# Patient Record
Sex: FEMALE | Race: WHITE | NOT HISPANIC OR LATINO | URBAN - METROPOLITAN AREA
[De-identification: names, ages, dates, MRNs, and addresses within clinical notes are randomized per-mention and may not be internally consistent; named-entity substitution may affect disease eponyms.]

---

## 2019-10-31 ENCOUNTER — EMERGENCY (EMERGENCY)
Facility: HOSPITAL | Age: 64
LOS: 1 days | Discharge: HOME | End: 2019-10-31
Attending: EMERGENCY MEDICINE
Payer: MEDICARE

## 2019-10-31 VITALS
HEIGHT: 60 IN | HEART RATE: 124 BPM | WEIGHT: 139.99 LBS | OXYGEN SATURATION: 96 % | SYSTOLIC BLOOD PRESSURE: 162 MMHG | TEMPERATURE: 98 F | RESPIRATION RATE: 16 BRPM | DIASTOLIC BLOOD PRESSURE: 908 MMHG

## 2019-10-31 VITALS
HEART RATE: 75 BPM | OXYGEN SATURATION: 97 % | TEMPERATURE: 98 F | SYSTOLIC BLOOD PRESSURE: 167 MMHG | DIASTOLIC BLOOD PRESSURE: 93 MMHG | RESPIRATION RATE: 18 BRPM

## 2019-10-31 PROCEDURE — 73562 X-RAY EXAM OF KNEE 3: CPT

## 2019-10-31 PROCEDURE — 99283 EMERGENCY DEPT VISIT LOW MDM: CPT

## 2019-10-31 PROCEDURE — 73562 X-RAY EXAM OF KNEE 3: CPT | Mod: 26,50

## 2019-10-31 PROCEDURE — 99283 EMERGENCY DEPT VISIT LOW MDM: CPT | Mod: 25

## 2019-10-31 PROCEDURE — 96372 THER/PROPH/DIAG INJ SC/IM: CPT

## 2019-10-31 PROCEDURE — 93005 ELECTROCARDIOGRAM TRACING: CPT

## 2019-10-31 RX ORDER — METOPROLOL TARTRATE 50 MG
50 TABLET ORAL ONCE
Refills: 0 | Status: COMPLETED | OUTPATIENT
Start: 2019-10-31 | End: 2019-10-31

## 2019-10-31 RX ORDER — BUPIVACAINE HCL/PF 7.5 MG/ML
10 VIAL (ML) INJECTION ONCE
Refills: 0 | Status: DISCONTINUED | OUTPATIENT
Start: 2019-10-31 | End: 2019-11-17

## 2019-10-31 RX ORDER — KETOROLAC TROMETHAMINE 30 MG/ML
60 SYRINGE (ML) INJECTION ONCE
Refills: 0 | Status: DISCONTINUED | OUTPATIENT
Start: 2019-10-31 | End: 2019-10-31

## 2019-10-31 RX ADMIN — Medication 50 MILLIGRAM(S): at 22:49

## 2019-10-31 RX ADMIN — Medication 60 MILLIGRAM(S): at 22:49

## 2019-10-31 NOTE — ED ADULT NURSE NOTE - OBJECTIVE STATEMENT
AOX4 +ambulatory patient reports raul knee swelling x a couple days. Patient denies any trauma no fevers or chills. patient states her HR is normally high since she was a kid. No chest pains or shortness of breath.

## 2019-11-01 RX ORDER — DICLOFENAC SODIUM 30 MG/G
2 GEL TOPICAL
Qty: 250 | Refills: 1
Start: 2019-11-01 | End: 2019-12-30

## 2019-11-01 NOTE — ED PROVIDER NOTE - PATIENT PORTAL LINK FT
You can access the FollowMyHealth Patient Portal offered by [facility] by registering at the following website: [email or web address]. By joining [employer]’s FollowMyHealth portal, you will also be able to view your health information using other applications (apps) compatible with our system.

## 2019-11-01 NOTE — ED PROVIDER NOTE - PROGRESS NOTE DETAILS
EKG - nsr, tachy in 120s   xrays - bilateral knee arthritis   HR improved after usual dose of metoprolol; pt without palpitations, CP, SOB, etc.   Given compressive wraps for knee swelling. Pain improved. Will fu with ortho and PCP. Discussed indications for patient return to ED. Patient understood.

## 2019-11-01 NOTE — ED PROVIDER NOTE - OBJECTIVE STATEMENT
tachycardia, HTN, OA 63 yo F pmh of tachycardia, HTN, and OA presents with a few days of bilateral knee pain worse from baseline, worse on R knee, worse with weight bearing and walking. Asking to have "knees drained." Using a cane with ambulation bc of pain. Did not take metoprolol today for her tachycardia; states HR is always 100-130 when she visits PCP. Denies fever, trauma, calf pain or swelling, hx of DVT/PE, other acute complaints.

## 2019-11-01 NOTE — ED PROVIDER NOTE - CLINICAL SUMMARY MEDICAL DECISION MAKING FREE TEXT BOX
63 yo F with atraumatic knee pain. No fever, no local signs of infection. Symptoms likely from OA. Also with tachycardia, which pt states is baseline, has not taken metoprolol. Will give pain meds, xrays, EKG, reassess.

## 2019-11-01 NOTE — ED PROVIDER NOTE - PHYSICAL EXAMINATION
GENERAL: wells appearing, no acute distress   HEAD: atraumatic   EYES: EOMI, pink conjunctiva   ENT: moist oral mucosa   CARDIAC: tachycardic, distal pulses present   RESPIRATORY: lungs CTAB, no increased work of breathing   GASTROINTESTINAL: no abdominal tenderness, no rebound or guarding, bowel sounds presents  GENITOURINARY: no CVA tenderness   MUSCULOSKELETAL: bilateral knee swelling, R greater than L; no warmth; full but painful ROM; no calf swelling; compartments of leg soft   NEUROLOGICAL: AAOx3, spontaneous movement of extremities   SKIN: intact   PSYCHIATRIC: cooperative  HEME LYMPH: no lymphadenopathy

## 2022-09-28 NOTE — ED ADULT TRIAGE NOTE - TEMPERATURE IN FAHRENHEIT (DEGREES F)
Physical Therapy   Date: 9/28/2022  Patient did not attend nor call to cancel  today's (9/28/2022) scheduled appointment.  Therapist called patient, who stated he had called previously to cancel this appointment, though no note regarding this is present in the system. Patient verbalized he is aware of and would be at the appointment on Friday, 9/30/22 with brace, which he received earlier this week.      
97.9

## 2023-03-31 ENCOUNTER — INPATIENT (INPATIENT)
Facility: HOSPITAL | Age: 68
LOS: 9 days | Discharge: NURSING, REHAB OR LONG-TERM CARE | DRG: 689 | End: 2023-04-10
Attending: STUDENT IN AN ORGANIZED HEALTH CARE EDUCATION/TRAINING PROGRAM | Admitting: STUDENT IN AN ORGANIZED HEALTH CARE EDUCATION/TRAINING PROGRAM
Payer: MEDICARE

## 2023-03-31 VITALS
TEMPERATURE: 99 F | OXYGEN SATURATION: 95 % | DIASTOLIC BLOOD PRESSURE: 92 MMHG | RESPIRATION RATE: 18 BRPM | WEIGHT: 117.95 LBS | HEART RATE: 116 BPM | SYSTOLIC BLOOD PRESSURE: 166 MMHG

## 2023-03-31 DIAGNOSIS — M62.82 RHABDOMYOLYSIS: ICD-10-CM

## 2023-03-31 LAB
ALBUMIN SERPL ELPH-MCNC: 3 G/DL — LOW (ref 3.5–5)
ALP SERPL-CCNC: 77 U/L — SIGNIFICANT CHANGE UP (ref 40–120)
ALT FLD-CCNC: 26 U/L DA — SIGNIFICANT CHANGE UP (ref 10–60)
ANION GAP SERPL CALC-SCNC: 8 MMOL/L — SIGNIFICANT CHANGE UP (ref 5–17)
APPEARANCE UR: ABNORMAL
AST SERPL-CCNC: 61 U/L — HIGH (ref 10–40)
BASOPHILS # BLD AUTO: 0.01 K/UL — SIGNIFICANT CHANGE UP (ref 0–0.2)
BASOPHILS NFR BLD AUTO: 0.1 % — SIGNIFICANT CHANGE UP (ref 0–2)
BILIRUB SERPL-MCNC: 0.5 MG/DL — SIGNIFICANT CHANGE UP (ref 0.2–1.2)
BILIRUB UR-MCNC: NEGATIVE — SIGNIFICANT CHANGE UP
BUN SERPL-MCNC: 48 MG/DL — HIGH (ref 7–18)
CALCIUM SERPL-MCNC: 9.3 MG/DL — SIGNIFICANT CHANGE UP (ref 8.4–10.5)
CHLORIDE SERPL-SCNC: 101 MMOL/L — SIGNIFICANT CHANGE UP (ref 96–108)
CK MB BLD-MCNC: <0.2 % — SIGNIFICANT CHANGE UP (ref 0–3.5)
CK MB CFR SERPL CALC: <1 NG/ML — SIGNIFICANT CHANGE UP (ref 0–3.6)
CK SERPL-CCNC: 606 U/L — HIGH (ref 21–215)
CO2 SERPL-SCNC: 24 MMOL/L — SIGNIFICANT CHANGE UP (ref 22–31)
COLOR SPEC: YELLOW — SIGNIFICANT CHANGE UP
CREAT SERPL-MCNC: 1 MG/DL — SIGNIFICANT CHANGE UP (ref 0.5–1.3)
DIFF PNL FLD: ABNORMAL
EGFR: 61 ML/MIN/1.73M2 — SIGNIFICANT CHANGE UP
EOSINOPHIL # BLD AUTO: 0 K/UL — SIGNIFICANT CHANGE UP (ref 0–0.5)
EOSINOPHIL NFR BLD AUTO: 0 % — SIGNIFICANT CHANGE UP (ref 0–6)
GLUCOSE SERPL-MCNC: 111 MG/DL — HIGH (ref 70–99)
GLUCOSE UR QL: NEGATIVE — SIGNIFICANT CHANGE UP
HCT VFR BLD CALC: 42.9 % — SIGNIFICANT CHANGE UP (ref 34.5–45)
HGB BLD-MCNC: 13 G/DL — SIGNIFICANT CHANGE UP (ref 11.5–15.5)
IMM GRANULOCYTES NFR BLD AUTO: 0.5 % — SIGNIFICANT CHANGE UP (ref 0–0.9)
KETONES UR-MCNC: ABNORMAL
LEUKOCYTE ESTERASE UR-ACNC: ABNORMAL
LYMPHOCYTES # BLD AUTO: 0.69 K/UL — LOW (ref 1–3.3)
LYMPHOCYTES # BLD AUTO: 6.8 % — LOW (ref 13–44)
MAGNESIUM SERPL-MCNC: 2.8 MG/DL — HIGH (ref 1.6–2.6)
MCHC RBC-ENTMCNC: 24.2 PG — LOW (ref 27–34)
MCHC RBC-ENTMCNC: 30.3 GM/DL — LOW (ref 32–36)
MCV RBC AUTO: 79.9 FL — LOW (ref 80–100)
MONOCYTES # BLD AUTO: 0.79 K/UL — SIGNIFICANT CHANGE UP (ref 0–0.9)
MONOCYTES NFR BLD AUTO: 7.8 % — SIGNIFICANT CHANGE UP (ref 2–14)
NEUTROPHILS # BLD AUTO: 8.6 K/UL — HIGH (ref 1.8–7.4)
NEUTROPHILS NFR BLD AUTO: 84.8 % — HIGH (ref 43–77)
NITRITE UR-MCNC: NEGATIVE — SIGNIFICANT CHANGE UP
NRBC # BLD: 0 /100 WBCS — SIGNIFICANT CHANGE UP (ref 0–0)
PH UR: 6 — SIGNIFICANT CHANGE UP (ref 5–8)
PLATELET # BLD AUTO: 177 K/UL — SIGNIFICANT CHANGE UP (ref 150–400)
POTASSIUM SERPL-MCNC: 3.6 MMOL/L — SIGNIFICANT CHANGE UP (ref 3.5–5.3)
POTASSIUM SERPL-SCNC: 3.6 MMOL/L — SIGNIFICANT CHANGE UP (ref 3.5–5.3)
PROT SERPL-MCNC: 7.9 G/DL — SIGNIFICANT CHANGE UP (ref 6–8.3)
PROT UR-MCNC: 30 MG/DL
RBC # BLD: 5.37 M/UL — HIGH (ref 3.8–5.2)
RBC # FLD: 15.2 % — HIGH (ref 10.3–14.5)
SODIUM SERPL-SCNC: 133 MMOL/L — LOW (ref 135–145)
SP GR SPEC: 1.01 — SIGNIFICANT CHANGE UP (ref 1.01–1.02)
TROPONIN I, HIGH SENSITIVITY RESULT: 47.3 NG/L — SIGNIFICANT CHANGE UP
UROBILINOGEN FLD QL: 1 MG/DL
WBC # BLD: 10.14 K/UL — SIGNIFICANT CHANGE UP (ref 3.8–10.5)
WBC # FLD AUTO: 10.14 K/UL — SIGNIFICANT CHANGE UP (ref 3.8–10.5)

## 2023-03-31 PROCEDURE — 99285 EMERGENCY DEPT VISIT HI MDM: CPT | Mod: CS

## 2023-03-31 PROCEDURE — 99222 1ST HOSP IP/OBS MODERATE 55: CPT | Mod: GC

## 2023-03-31 PROCEDURE — 70450 CT HEAD/BRAIN W/O DYE: CPT | Mod: 26,MG

## 2023-03-31 PROCEDURE — 72125 CT NECK SPINE W/O DYE: CPT | Mod: 26,MG

## 2023-03-31 PROCEDURE — G1004: CPT

## 2023-03-31 RX ORDER — SODIUM CHLORIDE 9 MG/ML
1000 INJECTION INTRAMUSCULAR; INTRAVENOUS; SUBCUTANEOUS ONCE
Refills: 0 | Status: COMPLETED | OUTPATIENT
Start: 2023-03-31 | End: 2023-03-31

## 2023-03-31 RX ADMIN — SODIUM CHLORIDE 1000 MILLILITER(S): 9 INJECTION INTRAMUSCULAR; INTRAVENOUS; SUBCUTANEOUS at 20:52

## 2023-03-31 RX ADMIN — SODIUM CHLORIDE 1000 MILLILITER(S): 9 INJECTION INTRAMUSCULAR; INTRAVENOUS; SUBCUTANEOUS at 21:48

## 2023-03-31 NOTE — ED PROVIDER NOTE - PROGRESS NOTE DETAILS
labs significant for rhabdo with ck of 600 and dehydration with creat of 1 and BUN of 48.  FLuids given in ED.  Pt to be admitted.

## 2023-03-31 NOTE — H&P ADULT - NSHPPHYSICALEXAM_GEN_ALL_CORE
Vital Signs Last 24 Hrs  T(C): 37 (31 Mar 2023 23:18), Max: 37.1 (31 Mar 2023 18:56)  T(F): 98.6 (31 Mar 2023 23:18), Max: 98.8 (31 Mar 2023 18:56)  HR: 99 (31 Mar 2023 23:18) (99 - 116)  BP: 138/72 (31 Mar 2023 23:18) (138/72 - 166/92)  BP(mean): 94 (31 Mar 2023 23:18) (94 - 94)  RR: 18 (31 Mar 2023 23:18) (18 - 18)  SpO2: 95% (31 Mar 2023 23:18) (95% - 95%)    Parameters below as of 31 Mar 2023 23:18  Patient On (Oxygen Delivery Method): room air    GENERAL: NAD, lying in bed comfortably (+) Disheveled  HEAD:  Atraumatic, Normocephalic  EYES: EOMI, PERRLA, conjunctiva and sclera clear  ENT: Moist mucous membranes  NECK: Supple, No JVD  CHEST/LUNG: Clear to auscultation bilaterally; No rales, rhonchi, wheezing, or rubs. Unlabored respirations  HEART: Regular rate and rhythm; No murmurs, rubs, or gallops  ABDOMEN: Bowel sounds present; Soft, Nontender, Nondistended. No hepatomegally  EXTREMITIES:  2+ Peripheral Pulses, brisk capillary refill. No clubbing, cyanosis, or edema  NERVOUS SYSTEM:  Alert & Oriented X3, speech clear. No deficits   MSK: FROM all 4 extremities, full and equal strength (+) b/l contusions, healing on b/l knees  SKIN: No rashes or lesions (+) Contusions, healing on back and shoulders

## 2023-03-31 NOTE — ED ADULT NURSE NOTE - NSIMPLEMENTINTERV_GEN_ALL_ED
Implemented All Fall Risk Interventions:  [place] to call system. Call bell, personal items and telephone within reach. Instruct patient to call for assistance. Room bathroom lighting operational. Non-slip footwear when patient is off stretcher. Physically safe environment: no spills, clutter or unnecessary equipment. Stretcher in lowest position, wheels locked, appropriate side rails in place. Provide visual cue, wrist band, yellow gown, etc. Monitor gait and stability. Monitor for mental status changes and reorient to person, place, and time. Review medications for side effects contributing to fall risk. Reinforce activity limits and safety measures with patient and family.

## 2023-03-31 NOTE — H&P ADULT - PROBLEM SELECTOR PLAN 1
s/p accidental mechanical fall 3 days ago, No LOC and head trauma s/p accidental mechanical fall 3 days ago, No LOC and head trauma  CTH only chronic microvascular changes, no acute pathology  CT spine no fractures  denies any pain   CK mildly elevated 606, low concern for rhabdomyolysis at this time   BUN 48 with a normal SCr  c/w IVF   f/u repeat CK in the AM  PT consulted  SW consulted

## 2023-03-31 NOTE — H&P ADULT - ASSESSMENT
67F from home, ambulates with cane, PMHx HTN and Arthritis, BIBEMS being found on the floor from a fall 3 days ago. CT imaging negative for acute fractures. Aseptic appearing, no WBC and VSS. CK mildly elevated but with normal kidney function and mild microscopic hematuria. Found to have Coronavirus (+) but remains asymptomatic. Admitted for further monitoring and PT evaluation for unsafe discharge disposition

## 2023-03-31 NOTE — H&P ADULT - HISTORY OF PRESENT ILLNESS
67F from home, ambulates with cane, PMHx HTN and Arthritis, BIBEMS being found on the floor from a fall 3 days ago. She reports that she fell over a box and was unable to get up herself, claims that for the past 3 days she has been "scooting around" but denies crawling. Claims that shes was able to slide herself to the bathroom where she would use a bucket that was on the floor.  Patient was too weak to lift herself up to the toilet.  After patient went to finish using the bathroom she states she would crawl back to her bedroom however remained on the floor as she was too weak to get up off the bed. Claims that she had a phone with her and called her boyfriend who was trying to get a  to unlock her door which he was not able to do until today. States that she has not been able to eat but reports only being able to drink water. Also reports being thirsty however denying headache. Denies HA, dizziness, lightheadedness, weakness, and fatigue prior to fall. Denies LOC and head trauma. Denies fevers, chills, and night sweats. Patient able to move all extremities and denies pain. Patient denies HA, chest pain, SOB, abdominal pain, n/v/d, and no changes in urination and bm.

## 2023-03-31 NOTE — ED ADULT TRIAGE NOTE - CHIEF COMPLAINT QUOTE
BIBA found on floor sp fall 2 days ago. patient is A&Ox3, reports tripping over furniture, unable to get up  c/o chronic arthritis pain

## 2023-03-31 NOTE — H&P ADULT - PROBLEM SELECTOR PLAN 2
s/p mechanical accidental fall   ambulates cautiously (2/2 arthritis in b/l knee) with cane at baseline  CT imaging negative for acute fractures  primary team to ambulate in the AM  PT consulted

## 2023-03-31 NOTE — ED PROVIDER NOTE - OBJECTIVE STATEMENT
67-year-old female history of hypertension and history of arthritis presents to ED following a fall 2 days ago.  Patient is ANO x3 in ED.  After patient she had a box in her apartment that she tripped over 2 days ago.  As per patient she was too weak to get up.  Patient was able to slide herself to the bathroom where she would use a bucket that was on the floor.  Patient was too weak to lift herself up to the toilet.  After patient went to finish using the bathroom she states she would crawl back to her bedroom however remained on the floor as she was too weak to get up off the bed.  Patient's boyfriend came to department today and was able to help patient up.  Patient is here in ED for further evaluation.  Patient reports being thirsty however denying headache no chest pain no abdominal pain no back pain no lower extremity pain.  Patient able to move all extremities.

## 2023-03-31 NOTE — ED PROVIDER NOTE - CLINICAL SUMMARY MEDICAL DECISION MAKING FREE TEXT BOX
67-year-old female history of hypertension and arthritis presents to ED following a fall 2 days ago.  As per patient around her apartment because she has been unable to get up.  Concern for possible rhabdo or other electrolyte abnormalities.  Patient's voicing wanting to go home upon evaluation being complete.  Patient says her boyfriend will come and stay with her and if imaging is negative patient prefers to be discharged.  Will check labs, IV fluids, CT scan, and reassess

## 2023-03-31 NOTE — H&P ADULT - ATTENDING COMMENTS
68yo F PMHx HTN and OA not on medication presents to ED after fall 2 days ago. States she tripped over a box at her home and fell onto her buttocks. Remember the whole event. No prodromal symptoms, no lightheadedness, no LOC, no head trauma. Per patient and ED attending, stated she was too weak to fully ambulate and too weak to lift herself up to use her restroom and so used bucket. Lives alone, but boyfriend came to her apt today and brought her to ED for further evaluation. Of note, patient states normally she uses a cane as her right knee OA makes it difficult to walk. No chest pain, no shortness of breath, no fever, chills, nausea, vomiting.     10-pt ROS negative except for above.     VSS  Labs:  CK 600s                        13.0   10.14 )-----------( 177      ( 31 Mar 2023 20:50 )             42.9   03-31    133<L>  |  101  |  48<H>  ----------------------------<  111<H>  3.6   |  24  |  1.00    Ca    9.3      31 Mar 2023 20:50  Mg     2.8     03-31    TPro  7.9  /  Alb  3.0<L>  /  TBili  0.5  /  DBili  x   /  AST  61<H>  /  ALT  26  /  AlkPhos  77  03-31    Physical Exam:  Gen: NAD, disheveled  Heart: RRR  Lungs: CTAB  Abdomen: ntnd  Extremities: pulses 2+ b/l, no edema, AROM 5/5 bilaterally. Pt was able to stand with holding the bed given her need for her cane which is not present at bedside. Was able to take steps without human assistance with adequate balance control.    A/P:  68yo F PMHx HTN, OA presents to ED for 2 days s/p fall admitted for resolving Rhabdomyolysis with inability ambulate. Low likelyhood for continuing rhabdo given CK <1500 and no myalgia. Patient able to ambulate, however would benefit from cane or walker   - continue w/ IVF hydration for the night, D/C in AM and continue w/ PO hydration  - Diet as tolerated  - walk patient and reassess in AM  - obtain CK in AM      Discussed with resident and agree with rest of above plan.
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
## 2023-04-01 DIAGNOSIS — I10 ESSENTIAL (PRIMARY) HYPERTENSION: ICD-10-CM

## 2023-04-01 DIAGNOSIS — R26.2 DIFFICULTY IN WALKING, NOT ELSEWHERE CLASSIFIED: ICD-10-CM

## 2023-04-01 DIAGNOSIS — U07.1 COVID-19: ICD-10-CM

## 2023-04-01 DIAGNOSIS — M62.82 RHABDOMYOLYSIS: ICD-10-CM

## 2023-04-01 DIAGNOSIS — Z29.9 ENCOUNTER FOR PROPHYLACTIC MEASURES, UNSPECIFIED: ICD-10-CM

## 2023-04-01 LAB
ALBUMIN SERPL ELPH-MCNC: 2.5 G/DL — LOW (ref 3.5–5)
ALP SERPL-CCNC: 62 U/L — SIGNIFICANT CHANGE UP (ref 40–120)
ALT FLD-CCNC: 22 U/L DA — SIGNIFICANT CHANGE UP (ref 10–60)
ANION GAP SERPL CALC-SCNC: 7 MMOL/L — SIGNIFICANT CHANGE UP (ref 5–17)
AST SERPL-CCNC: 50 U/L — HIGH (ref 10–40)
B PERT DNA SPEC QL NAA+PROBE: SIGNIFICANT CHANGE UP
BASOPHILS # BLD AUTO: 0.01 K/UL — SIGNIFICANT CHANGE UP (ref 0–0.2)
BASOPHILS NFR BLD AUTO: 0.1 % — SIGNIFICANT CHANGE UP (ref 0–2)
BILIRUB SERPL-MCNC: 0.6 MG/DL — SIGNIFICANT CHANGE UP (ref 0.2–1.2)
BUN SERPL-MCNC: 31 MG/DL — HIGH (ref 7–18)
C PNEUM DNA SPEC QL NAA+PROBE: SIGNIFICANT CHANGE UP
CALCIUM SERPL-MCNC: 8.4 MG/DL — SIGNIFICANT CHANGE UP (ref 8.4–10.5)
CHLORIDE SERPL-SCNC: 107 MMOL/L — SIGNIFICANT CHANGE UP (ref 96–108)
CO2 SERPL-SCNC: 24 MMOL/L — SIGNIFICANT CHANGE UP (ref 22–31)
CREAT SERPL-MCNC: 0.69 MG/DL — SIGNIFICANT CHANGE UP (ref 0.5–1.3)
EGFR: 94 ML/MIN/1.73M2 — SIGNIFICANT CHANGE UP
EOSINOPHIL # BLD AUTO: 0 K/UL — SIGNIFICANT CHANGE UP (ref 0–0.5)
EOSINOPHIL NFR BLD AUTO: 0 % — SIGNIFICANT CHANGE UP (ref 0–6)
FLUAV H1 2009 PAND RNA SPEC QL NAA+PROBE: SIGNIFICANT CHANGE UP
FLUAV H1 RNA SPEC QL NAA+PROBE: SIGNIFICANT CHANGE UP
FLUAV H3 RNA SPEC QL NAA+PROBE: SIGNIFICANT CHANGE UP
FLUAV SUBTYP SPEC NAA+PROBE: SIGNIFICANT CHANGE UP
FLUBV RNA SPEC QL NAA+PROBE: SIGNIFICANT CHANGE UP
GLUCOSE SERPL-MCNC: 92 MG/DL — SIGNIFICANT CHANGE UP (ref 70–99)
HADV DNA SPEC QL NAA+PROBE: SIGNIFICANT CHANGE UP
HCOV PNL SPEC NAA+PROBE: SIGNIFICANT CHANGE UP
HCT VFR BLD CALC: 37.9 % — SIGNIFICANT CHANGE UP (ref 34.5–45)
HCV AB S/CO SERPL IA: 0.11 S/CO — SIGNIFICANT CHANGE UP (ref 0–0.99)
HCV AB SERPL-IMP: SIGNIFICANT CHANGE UP
HGB BLD-MCNC: 11.6 G/DL — SIGNIFICANT CHANGE UP (ref 11.5–15.5)
HMPV RNA SPEC QL NAA+PROBE: SIGNIFICANT CHANGE UP
HPIV1 RNA SPEC QL NAA+PROBE: SIGNIFICANT CHANGE UP
HPIV2 RNA SPEC QL NAA+PROBE: SIGNIFICANT CHANGE UP
HPIV3 RNA SPEC QL NAA+PROBE: SIGNIFICANT CHANGE UP
HPIV4 RNA SPEC QL NAA+PROBE: SIGNIFICANT CHANGE UP
IMM GRANULOCYTES NFR BLD AUTO: 0.6 % — SIGNIFICANT CHANGE UP (ref 0–0.9)
LYMPHOCYTES # BLD AUTO: 0.79 K/UL — LOW (ref 1–3.3)
LYMPHOCYTES # BLD AUTO: 9.3 % — LOW (ref 13–44)
MAGNESIUM SERPL-MCNC: 2.3 MG/DL — SIGNIFICANT CHANGE UP (ref 1.6–2.6)
MCHC RBC-ENTMCNC: 24.7 PG — LOW (ref 27–34)
MCHC RBC-ENTMCNC: 30.6 GM/DL — LOW (ref 32–36)
MCV RBC AUTO: 80.8 FL — SIGNIFICANT CHANGE UP (ref 80–100)
MONOCYTES # BLD AUTO: 0.6 K/UL — SIGNIFICANT CHANGE UP (ref 0–0.9)
MONOCYTES NFR BLD AUTO: 7 % — SIGNIFICANT CHANGE UP (ref 2–14)
NEUTROPHILS # BLD AUTO: 7.07 K/UL — SIGNIFICANT CHANGE UP (ref 1.8–7.4)
NEUTROPHILS NFR BLD AUTO: 83 % — HIGH (ref 43–77)
NRBC # BLD: 0 /100 WBCS — SIGNIFICANT CHANGE UP (ref 0–0)
PHOSPHATE SERPL-MCNC: 1.9 MG/DL — LOW (ref 2.5–4.5)
PLATELET # BLD AUTO: 156 K/UL — SIGNIFICANT CHANGE UP (ref 150–400)
POTASSIUM SERPL-MCNC: 3.3 MMOL/L — LOW (ref 3.5–5.3)
POTASSIUM SERPL-SCNC: 3.3 MMOL/L — LOW (ref 3.5–5.3)
PROT SERPL-MCNC: 6.6 G/DL — SIGNIFICANT CHANGE UP (ref 6–8.3)
RAPID RVP RESULT: DETECTED
RBC # BLD: 4.69 M/UL — SIGNIFICANT CHANGE UP (ref 3.8–5.2)
RBC # FLD: 15.1 % — HIGH (ref 10.3–14.5)
RV+EV RNA SPEC QL NAA+PROBE: SIGNIFICANT CHANGE UP
SARS-COV-2 RNA SPEC QL NAA+PROBE: DETECTED
SODIUM SERPL-SCNC: 138 MMOL/L — SIGNIFICANT CHANGE UP (ref 135–145)
WBC # BLD: 8.52 K/UL — SIGNIFICANT CHANGE UP (ref 3.8–10.5)
WBC # FLD AUTO: 8.52 K/UL — SIGNIFICANT CHANGE UP (ref 3.8–10.5)

## 2023-04-01 PROCEDURE — 99233 SBSQ HOSP IP/OBS HIGH 50: CPT

## 2023-04-01 RX ORDER — ACETAMINOPHEN 500 MG
650 TABLET ORAL EVERY 6 HOURS
Refills: 0 | Status: DISCONTINUED | OUTPATIENT
Start: 2023-04-01 | End: 2023-04-10

## 2023-04-01 RX ORDER — SODIUM CHLORIDE 9 MG/ML
1000 INJECTION INTRAMUSCULAR; INTRAVENOUS; SUBCUTANEOUS
Refills: 0 | Status: DISCONTINUED | OUTPATIENT
Start: 2023-04-01 | End: 2023-04-05

## 2023-04-01 RX ORDER — AMLODIPINE BESYLATE 2.5 MG/1
5 TABLET ORAL DAILY
Refills: 0 | Status: DISCONTINUED | OUTPATIENT
Start: 2023-04-01 | End: 2023-04-04

## 2023-04-01 RX ORDER — ONDANSETRON 8 MG/1
4 TABLET, FILM COATED ORAL EVERY 8 HOURS
Refills: 0 | Status: DISCONTINUED | OUTPATIENT
Start: 2023-04-01 | End: 2023-04-10

## 2023-04-01 RX ORDER — SODIUM,POTASSIUM PHOSPHATES 278-250MG
1 POWDER IN PACKET (EA) ORAL ONCE
Refills: 0 | Status: COMPLETED | OUTPATIENT
Start: 2023-04-01 | End: 2023-04-01

## 2023-04-01 RX ORDER — POTASSIUM CHLORIDE 20 MEQ
40 PACKET (EA) ORAL ONCE
Refills: 0 | Status: COMPLETED | OUTPATIENT
Start: 2023-04-01 | End: 2023-04-01

## 2023-04-01 RX ORDER — ENOXAPARIN SODIUM 100 MG/ML
40 INJECTION SUBCUTANEOUS EVERY 24 HOURS
Refills: 0 | Status: DISCONTINUED | OUTPATIENT
Start: 2023-04-01 | End: 2023-04-10

## 2023-04-01 RX ADMIN — AMLODIPINE BESYLATE 5 MILLIGRAM(S): 2.5 TABLET ORAL at 18:10

## 2023-04-01 RX ADMIN — Medication 650 MILLIGRAM(S): at 23:30

## 2023-04-01 RX ADMIN — Medication 40 MILLIEQUIVALENT(S): at 07:01

## 2023-04-01 RX ADMIN — Medication 1 PACKET(S): at 12:09

## 2023-04-01 RX ADMIN — ENOXAPARIN SODIUM 40 MILLIGRAM(S): 100 INJECTION SUBCUTANEOUS at 05:13

## 2023-04-01 RX ADMIN — SODIUM CHLORIDE 100 MILLILITER(S): 9 INJECTION INTRAMUSCULAR; INTRAVENOUS; SUBCUTANEOUS at 16:12

## 2023-04-01 RX ADMIN — Medication 650 MILLIGRAM(S): at 22:34

## 2023-04-01 RX ADMIN — SODIUM CHLORIDE 100 MILLILITER(S): 9 INJECTION INTRAMUSCULAR; INTRAVENOUS; SUBCUTANEOUS at 05:08

## 2023-04-01 NOTE — PHYSICAL THERAPY INITIAL EVALUATION ADULT - RANGE OF MOTION EXAMINATION, REHAB EVAL
except for left elbow ext limited by ~ 10 deg-right knee extension limited by ~ 10 deg; left knee flex 0-90 deg at least-claims with h/o OA/bilateral upper extremity ROM was WFL (within functional limits)/bilateral lower extremity ROM was WFL (within functional limits)

## 2023-04-01 NOTE — PATIENT PROFILE ADULT - FUNCTIONAL ASSESSMENT - BASIC MOBILITY 6.
2-calculated by average/Not able to assess (calculate score using [facility] averaging method)

## 2023-04-01 NOTE — PHYSICAL THERAPY INITIAL EVALUATION ADULT - CRITERIA FOR SKILLED THERAPEUTIC INTERVENTIONS
RAMA-pending functional progress/impairments found/functional limitations in following categories/anticipated discharge recommendation

## 2023-04-01 NOTE — PHYSICAL THERAPY INITIAL EVALUATION ADULT - GENERAL OBSERVATIONS, REHAB EVAL
Pt seen supine in bed with IV, denied c/o pain, noted with fading ecchymosis of right knee. Pt was cooperative during assessmetn

## 2023-04-01 NOTE — PATIENT PROFILE ADULT - FALL HARM RISK - HARM RISK INTERVENTIONS
Assistance with ambulation/Assistance OOB with selected safe patient handling equipment/Communicate Risk of Fall with Harm to all staff/Discuss with provider need for PT consult/Monitor gait and stability/Provide patient with walking aids - walker, cane, crutches/Reinforce activity limits and safety measures with patient and family/Tailored Fall Risk Interventions/Use of alarms - bed, chair and/or voice tab/Visual Cue: Yellow wristband and red socks/Bed in lowest position, wheels locked, appropriate side rails in place/Call bell, personal items and telephone in reach/Instruct patient to call for assistance before getting out of bed or chair/Non-slip footwear when patient is out of bed/[place] to call system/Physically safe environment - no spills, clutter or unnecessary equipment/Purposeful Proactive Rounding/Room/bathroom lighting operational, light cord in reach

## 2023-04-02 LAB
ALBUMIN SERPL ELPH-MCNC: 2.3 G/DL — LOW (ref 3.5–5)
ALP SERPL-CCNC: 55 U/L — SIGNIFICANT CHANGE UP (ref 40–120)
ALT FLD-CCNC: 19 U/L DA — SIGNIFICANT CHANGE UP (ref 10–60)
ANION GAP SERPL CALC-SCNC: 6 MMOL/L — SIGNIFICANT CHANGE UP (ref 5–17)
AST SERPL-CCNC: 41 U/L — HIGH (ref 10–40)
BASOPHILS # BLD AUTO: 0.02 K/UL — SIGNIFICANT CHANGE UP (ref 0–0.2)
BASOPHILS NFR BLD AUTO: 0.3 % — SIGNIFICANT CHANGE UP (ref 0–2)
BILIRUB SERPL-MCNC: 0.5 MG/DL — SIGNIFICANT CHANGE UP (ref 0.2–1.2)
BUN SERPL-MCNC: 11 MG/DL — SIGNIFICANT CHANGE UP (ref 7–18)
CALCIUM SERPL-MCNC: 8.2 MG/DL — LOW (ref 8.4–10.5)
CHLORIDE SERPL-SCNC: 109 MMOL/L — HIGH (ref 96–108)
CO2 SERPL-SCNC: 23 MMOL/L — SIGNIFICANT CHANGE UP (ref 22–31)
CREAT SERPL-MCNC: 0.46 MG/DL — LOW (ref 0.5–1.3)
EGFR: 104 ML/MIN/1.73M2 — SIGNIFICANT CHANGE UP
EOSINOPHIL # BLD AUTO: 0.01 K/UL — SIGNIFICANT CHANGE UP (ref 0–0.5)
EOSINOPHIL NFR BLD AUTO: 0.2 % — SIGNIFICANT CHANGE UP (ref 0–6)
GLUCOSE SERPL-MCNC: 96 MG/DL — SIGNIFICANT CHANGE UP (ref 70–99)
HCT VFR BLD CALC: 35 % — SIGNIFICANT CHANGE UP (ref 34.5–45)
HGB BLD-MCNC: 10.8 G/DL — LOW (ref 11.5–15.5)
IMM GRANULOCYTES NFR BLD AUTO: 0.8 % — SIGNIFICANT CHANGE UP (ref 0–0.9)
LYMPHOCYTES # BLD AUTO: 0.94 K/UL — LOW (ref 1–3.3)
LYMPHOCYTES # BLD AUTO: 14.8 % — SIGNIFICANT CHANGE UP (ref 13–44)
MAGNESIUM SERPL-MCNC: 2.1 MG/DL — SIGNIFICANT CHANGE UP (ref 1.6–2.6)
MCHC RBC-ENTMCNC: 24.6 PG — LOW (ref 27–34)
MCHC RBC-ENTMCNC: 30.9 GM/DL — LOW (ref 32–36)
MCV RBC AUTO: 79.7 FL — LOW (ref 80–100)
MONOCYTES # BLD AUTO: 0.41 K/UL — SIGNIFICANT CHANGE UP (ref 0–0.9)
MONOCYTES NFR BLD AUTO: 6.4 % — SIGNIFICANT CHANGE UP (ref 2–14)
NEUTROPHILS # BLD AUTO: 4.93 K/UL — SIGNIFICANT CHANGE UP (ref 1.8–7.4)
NEUTROPHILS NFR BLD AUTO: 77.5 % — HIGH (ref 43–77)
NRBC # BLD: 0 /100 WBCS — SIGNIFICANT CHANGE UP (ref 0–0)
PHOSPHATE SERPL-MCNC: 2.1 MG/DL — LOW (ref 2.5–4.5)
PLATELET # BLD AUTO: 161 K/UL — SIGNIFICANT CHANGE UP (ref 150–400)
POTASSIUM SERPL-MCNC: 3 MMOL/L — LOW (ref 3.5–5.3)
POTASSIUM SERPL-SCNC: 3 MMOL/L — LOW (ref 3.5–5.3)
PROT SERPL-MCNC: 5.9 G/DL — LOW (ref 6–8.3)
RBC # BLD: 4.39 M/UL — SIGNIFICANT CHANGE UP (ref 3.8–5.2)
RBC # FLD: 15 % — HIGH (ref 10.3–14.5)
SODIUM SERPL-SCNC: 138 MMOL/L — SIGNIFICANT CHANGE UP (ref 135–145)
WBC # BLD: 6.36 K/UL — SIGNIFICANT CHANGE UP (ref 3.8–10.5)
WBC # FLD AUTO: 6.36 K/UL — SIGNIFICANT CHANGE UP (ref 3.8–10.5)

## 2023-04-02 PROCEDURE — 99232 SBSQ HOSP IP/OBS MODERATE 35: CPT

## 2023-04-02 RX ORDER — POTASSIUM CHLORIDE 20 MEQ
40 PACKET (EA) ORAL ONCE
Refills: 0 | Status: COMPLETED | OUTPATIENT
Start: 2023-04-02 | End: 2023-04-02

## 2023-04-02 RX ADMIN — ENOXAPARIN SODIUM 40 MILLIGRAM(S): 100 INJECTION SUBCUTANEOUS at 06:36

## 2023-04-02 RX ADMIN — Medication 40 MILLIEQUIVALENT(S): at 17:49

## 2023-04-02 RX ADMIN — AMLODIPINE BESYLATE 5 MILLIGRAM(S): 2.5 TABLET ORAL at 06:36

## 2023-04-02 NOTE — DIETITIAN INITIAL EVALUATION ADULT - SIGNS/SYMPTOMS
Inadequate oral intake >2days PTA inhouse intake 26-50% with ongoing weakness  Inadequate oral intake >2days PTA inhouse intake 26-50% with ongoing weakness & wounds

## 2023-04-02 NOTE — DIETITIAN INITIAL EVALUATION ADULT - PERTINENT MEDS FT
MEDICATIONS  (STANDING):  amLODIPine   Tablet 5 milliGRAM(s) Oral daily  enoxaparin Injectable 40 milliGRAM(s) SubCutaneous every 24 hours  sodium chloride 0.9%. 1000 milliLiter(s) (100 mL/Hr) IV Continuous <Continuous>    MEDICATIONS  (PRN):  acetaminophen     Tablet .. 650 milliGRAM(s) Oral every 6 hours PRN Temp greater or equal to 38C (100.4F), Mild Pain (1 - 3)  ondansetron Injectable 4 milliGRAM(s) IV Push every 8 hours PRN Nausea and/or Vomiting

## 2023-04-02 NOTE — DIETITIAN INITIAL EVALUATION ADULT - ORAL INTAKE PTA/DIET HISTORY
Per pt. consumes 2 meals daily & skips lunch meal as pt. usually "awake around 12 noon" therefore has late breakfast.   Avoids "walnuts" due to "chocking"  No known food allergies

## 2023-04-02 NOTE — DIETITIAN INITIAL EVALUATION ADULT - ORAL NUTRITION SUPPLEMENTS
Add Ensure Compact, 4oz 1 serving BID ( 480 Kcal, Protein 18 grams) daily - prefers chocolate flavor

## 2023-04-02 NOTE — DIETITIAN INITIAL EVALUATION ADULT - PROBLEM SELECTOR PLAN 1
s/p accidental mechanical fall 3 days ago, No LOC and head trauma  CTH only chronic microvascular changes, no acute pathology  CT spine no fractures  denies any pain   CK mildly elevated 606, low concern for rhabdomyolysis at this time   BUN 48 with a normal SCr  c/w IVF   f/u repeat CK in the AM  PT consulted  SW consulted

## 2023-04-02 NOTE — DIETITIAN INITIAL EVALUATION ADULT - PERTINENT LABORATORY DATA
04-02    138  |  109<H>  |  11  ----------------------------<  96  3.0<L>   |  23  |  0.46<L>    Ca    8.2<L>      02 Apr 2023 07:23  Phos  2.1     04-02  Mg     2.1     04-02    TPro  5.9<L>  /  Alb  2.3<L>  /  TBili  0.5  /  DBili  x   /  AST  41<H>  /  ALT  19  /  AlkPhos  55  04-02

## 2023-04-02 NOTE — DIETITIAN INITIAL EVALUATION ADULT - PHYSCIAL ASSESSMENT
Per pt. recalls "intentionally losing weight"? however unable to provide UBW & amount?  debilitated/other (specify)

## 2023-04-03 DIAGNOSIS — N39.0 URINARY TRACT INFECTION, SITE NOT SPECIFIED: ICD-10-CM

## 2023-04-03 LAB
ALBUMIN SERPL ELPH-MCNC: 2.3 G/DL — LOW (ref 3.5–5)
ALP SERPL-CCNC: 66 U/L — SIGNIFICANT CHANGE UP (ref 40–120)
ALT FLD-CCNC: 20 U/L DA — SIGNIFICANT CHANGE UP (ref 10–60)
ANION GAP SERPL CALC-SCNC: 6 MMOL/L — SIGNIFICANT CHANGE UP (ref 5–17)
AST SERPL-CCNC: 33 U/L — SIGNIFICANT CHANGE UP (ref 10–40)
BASOPHILS # BLD AUTO: 0.02 K/UL — SIGNIFICANT CHANGE UP (ref 0–0.2)
BASOPHILS NFR BLD AUTO: 0.3 % — SIGNIFICANT CHANGE UP (ref 0–2)
BILIRUB SERPL-MCNC: 0.4 MG/DL — SIGNIFICANT CHANGE UP (ref 0.2–1.2)
BUN SERPL-MCNC: 11 MG/DL — SIGNIFICANT CHANGE UP (ref 7–18)
CALCIUM SERPL-MCNC: 8.8 MG/DL — SIGNIFICANT CHANGE UP (ref 8.4–10.5)
CHLORIDE SERPL-SCNC: 108 MMOL/L — SIGNIFICANT CHANGE UP (ref 96–108)
CO2 SERPL-SCNC: 27 MMOL/L — SIGNIFICANT CHANGE UP (ref 22–31)
CREAT SERPL-MCNC: 0.48 MG/DL — LOW (ref 0.5–1.3)
EGFR: 102 ML/MIN/1.73M2 — SIGNIFICANT CHANGE UP
EOSINOPHIL # BLD AUTO: 0.05 K/UL — SIGNIFICANT CHANGE UP (ref 0–0.5)
EOSINOPHIL NFR BLD AUTO: 0.8 % — SIGNIFICANT CHANGE UP (ref 0–6)
GLUCOSE SERPL-MCNC: 97 MG/DL — SIGNIFICANT CHANGE UP (ref 70–99)
HCT VFR BLD CALC: 36.6 % — SIGNIFICANT CHANGE UP (ref 34.5–45)
HGB BLD-MCNC: 11.2 G/DL — LOW (ref 11.5–15.5)
IMM GRANULOCYTES NFR BLD AUTO: 1.1 % — HIGH (ref 0–0.9)
LYMPHOCYTES # BLD AUTO: 0.98 K/UL — LOW (ref 1–3.3)
LYMPHOCYTES # BLD AUTO: 15.5 % — SIGNIFICANT CHANGE UP (ref 13–44)
MAGNESIUM SERPL-MCNC: 2.1 MG/DL — SIGNIFICANT CHANGE UP (ref 1.6–2.6)
MCHC RBC-ENTMCNC: 24.1 PG — LOW (ref 27–34)
MCHC RBC-ENTMCNC: 30.6 GM/DL — LOW (ref 32–36)
MCV RBC AUTO: 78.9 FL — LOW (ref 80–100)
MONOCYTES # BLD AUTO: 0.4 K/UL — SIGNIFICANT CHANGE UP (ref 0–0.9)
MONOCYTES NFR BLD AUTO: 6.3 % — SIGNIFICANT CHANGE UP (ref 2–14)
NEUTROPHILS # BLD AUTO: 4.81 K/UL — SIGNIFICANT CHANGE UP (ref 1.8–7.4)
NEUTROPHILS NFR BLD AUTO: 76 % — SIGNIFICANT CHANGE UP (ref 43–77)
NRBC # BLD: 0 /100 WBCS — SIGNIFICANT CHANGE UP (ref 0–0)
PHOSPHATE SERPL-MCNC: 2 MG/DL — LOW (ref 2.5–4.5)
PLATELET # BLD AUTO: 197 K/UL — SIGNIFICANT CHANGE UP (ref 150–400)
POTASSIUM SERPL-MCNC: 3.4 MMOL/L — LOW (ref 3.5–5.3)
POTASSIUM SERPL-SCNC: 3.4 MMOL/L — LOW (ref 3.5–5.3)
PROT SERPL-MCNC: 6.5 G/DL — SIGNIFICANT CHANGE UP (ref 6–8.3)
RBC # BLD: 4.64 M/UL — SIGNIFICANT CHANGE UP (ref 3.8–5.2)
RBC # FLD: 15 % — HIGH (ref 10.3–14.5)
SODIUM SERPL-SCNC: 141 MMOL/L — SIGNIFICANT CHANGE UP (ref 135–145)
WBC # BLD: 6.33 K/UL — SIGNIFICANT CHANGE UP (ref 3.8–10.5)
WBC # FLD AUTO: 6.33 K/UL — SIGNIFICANT CHANGE UP (ref 3.8–10.5)

## 2023-04-03 PROCEDURE — 99232 SBSQ HOSP IP/OBS MODERATE 35: CPT

## 2023-04-03 RX ORDER — SODIUM,POTASSIUM PHOSPHATES 278-250MG
1 POWDER IN PACKET (EA) ORAL ONCE
Refills: 0 | Status: COMPLETED | OUTPATIENT
Start: 2023-04-03 | End: 2023-04-03

## 2023-04-03 RX ORDER — POTASSIUM CHLORIDE 20 MEQ
40 PACKET (EA) ORAL EVERY 4 HOURS
Refills: 0 | Status: COMPLETED | OUTPATIENT
Start: 2023-04-03 | End: 2023-04-03

## 2023-04-03 RX ADMIN — ENOXAPARIN SODIUM 40 MILLIGRAM(S): 100 INJECTION SUBCUTANEOUS at 06:19

## 2023-04-03 RX ADMIN — AMLODIPINE BESYLATE 5 MILLIGRAM(S): 2.5 TABLET ORAL at 06:19

## 2023-04-03 RX ADMIN — Medication 1 TABLET(S): at 10:53

## 2023-04-03 RX ADMIN — Medication 40 MILLIEQUIVALENT(S): at 10:53

## 2023-04-03 NOTE — PROGRESS NOTE ADULT - PROBLEM SELECTOR PLAN 1
s/p accidental mechanical fall 3 days ago, No LOC and head trauma  CTH only chronic microvascular changes, no acute pathology  CT spine no fractures  denies any pain   CK mildly elevated 606, low concern for rhabdomyolysis at this time   BUN 48 with a normal SCr  c/w IVF   f/u repeat CK in the AM  PT consulted  SW consulted s/p accidental mechanical fall at home No LOC and head trauma  CTH only chronic microvascular changes, no acute pathology  CT spine no fractures  denies any pain   CK mildly elevated 606, low concern for rhabdomyolysis at this time   BUN 48 with a normal SCr  c/w IVF   PT consulted recommend RAMA however patient refusing and would like go to home with home PT

## 2023-04-03 NOTE — PROGRESS NOTE ADULT - PROBLEM SELECTOR PLAN 3
RVP (+) Coronavirus   asymptomatic  VSS  Isolation precautions  c/w supportive care RVP (+) Coronavirus   asymptomatic  Isolation precautions  c/w supportive care

## 2023-04-03 NOTE — ADVANCED PRACTICE NURSE CONSULT - RECOMMEDATIONS
-Clean all wounds with normal saline and apply skin prep to the surrounding skin  -Apply Bacitracin ointment to the R. Lower Leg wounds and leave open to air  -Apply Collagenase ointment to the slough areas of the Coccyx wound, apply saline moistened gauze to the wound bed, and cover with a Foam dressing Daily PRN  -Elevate/float the patients heels using heel protectors and reposition the patient Q 2hrs using wedges or pillows

## 2023-04-03 NOTE — PROGRESS NOTE ADULT - PROBLEM SELECTOR PLAN 2
s/p mechanical accidental fall   ambulates cautiously (2/2 arthritis in b/l knee) with cane at baseline  CT imaging negative for acute fractures  primary team to ambulate in the AM  PT consulted UA +   c/w Levaquin [id number]

## 2023-04-03 NOTE — PROGRESS NOTE ADULT - ATTENDING COMMENTS
67F PMH HTN, OA presents after a fall 2d ago with no LOC or head trauma. In ED was too weak to ambulate. Found COVID +. Will need dispo planning    Mechanical fall  ambulatory dysfunction  COVID  HTN  CK elevation    -CK elevated but no HESHAM  -monitor Cr  -COVID infection is mild and asx  -starting amlodipine for htn (not on home meds)  -dvt ppx  -PT: RAMA  -SHRUTHI li

## 2023-04-03 NOTE — ADVANCED PRACTICE NURSE CONSULT - ASSESSMENT
This is a 67yr old female patient admitted for Sacral Ulcer, presenting with the following:  -There is an Unstageable Pressure Injury to the Coccyx (4cm x 3cm x 0.2cm0 with slough (90%), pink tissue, and drainage  -There is evidence of scabbed Abrasions to the R. Lower Leg without drainage

## 2023-04-04 LAB
ALBUMIN SERPL ELPH-MCNC: 2.4 G/DL — LOW (ref 3.5–5)
ALP SERPL-CCNC: 67 U/L — SIGNIFICANT CHANGE UP (ref 40–120)
ALT FLD-CCNC: 20 U/L DA — SIGNIFICANT CHANGE UP (ref 10–60)
ANION GAP SERPL CALC-SCNC: 2 MMOL/L — LOW (ref 5–17)
AST SERPL-CCNC: 25 U/L — SIGNIFICANT CHANGE UP (ref 10–40)
BILIRUB SERPL-MCNC: 0.4 MG/DL — SIGNIFICANT CHANGE UP (ref 0.2–1.2)
BUN SERPL-MCNC: 12 MG/DL — SIGNIFICANT CHANGE UP (ref 7–18)
CALCIUM SERPL-MCNC: 9.2 MG/DL — SIGNIFICANT CHANGE UP (ref 8.4–10.5)
CHLORIDE SERPL-SCNC: 107 MMOL/L — SIGNIFICANT CHANGE UP (ref 96–108)
CO2 SERPL-SCNC: 27 MMOL/L — SIGNIFICANT CHANGE UP (ref 22–31)
CREAT SERPL-MCNC: 0.58 MG/DL — SIGNIFICANT CHANGE UP (ref 0.5–1.3)
EGFR: 98 ML/MIN/1.73M2 — SIGNIFICANT CHANGE UP
GLUCOSE SERPL-MCNC: 98 MG/DL — SIGNIFICANT CHANGE UP (ref 70–99)
HCT VFR BLD CALC: 39.3 % — SIGNIFICANT CHANGE UP (ref 34.5–45)
HGB BLD-MCNC: 11.9 G/DL — SIGNIFICANT CHANGE UP (ref 11.5–15.5)
MAGNESIUM SERPL-MCNC: 2.1 MG/DL — SIGNIFICANT CHANGE UP (ref 1.6–2.6)
MCHC RBC-ENTMCNC: 24.2 PG — LOW (ref 27–34)
MCHC RBC-ENTMCNC: 30.3 GM/DL — LOW (ref 32–36)
MCV RBC AUTO: 79.9 FL — LOW (ref 80–100)
NRBC # BLD: 0 /100 WBCS — SIGNIFICANT CHANGE UP (ref 0–0)
PHOSPHATE SERPL-MCNC: 2.4 MG/DL — LOW (ref 2.5–4.5)
PLATELET # BLD AUTO: 239 K/UL — SIGNIFICANT CHANGE UP (ref 150–400)
POTASSIUM SERPL-MCNC: 3.7 MMOL/L — SIGNIFICANT CHANGE UP (ref 3.5–5.3)
POTASSIUM SERPL-SCNC: 3.7 MMOL/L — SIGNIFICANT CHANGE UP (ref 3.5–5.3)
PROT SERPL-MCNC: 6.7 G/DL — SIGNIFICANT CHANGE UP (ref 6–8.3)
RBC # BLD: 4.92 M/UL — SIGNIFICANT CHANGE UP (ref 3.8–5.2)
RBC # FLD: 15 % — HIGH (ref 10.3–14.5)
SODIUM SERPL-SCNC: 136 MMOL/L — SIGNIFICANT CHANGE UP (ref 135–145)
WBC # BLD: 6.46 K/UL — SIGNIFICANT CHANGE UP (ref 3.8–10.5)
WBC # FLD AUTO: 6.46 K/UL — SIGNIFICANT CHANGE UP (ref 3.8–10.5)

## 2023-04-04 PROCEDURE — 99232 SBSQ HOSP IP/OBS MODERATE 35: CPT | Mod: GC

## 2023-04-04 RX ORDER — AMLODIPINE BESYLATE 2.5 MG/1
10 TABLET ORAL DAILY
Refills: 0 | Status: DISCONTINUED | OUTPATIENT
Start: 2023-04-04 | End: 2023-04-10

## 2023-04-04 RX ADMIN — ENOXAPARIN SODIUM 40 MILLIGRAM(S): 100 INJECTION SUBCUTANEOUS at 06:49

## 2023-04-04 RX ADMIN — AMLODIPINE BESYLATE 5 MILLIGRAM(S): 2.5 TABLET ORAL at 06:48

## 2023-04-04 RX ADMIN — AMLODIPINE BESYLATE 10 MILLIGRAM(S): 2.5 TABLET ORAL at 17:23

## 2023-04-04 NOTE — PROGRESS NOTE ADULT - PROBLEM SELECTOR PLAN 1
s/p accidental mechanical fall at home No LOC and head trauma  CTH only chronic microvascular changes, no acute pathology  CT spine no fractures  denies any pain   BUN 48 with a normal SCr  c/w IVF   PT consulted recommend RAMA however patient refusing and would like go to home with home PT.   patient is medically cleared for discharge.

## 2023-04-04 NOTE — PROGRESS NOTE ADULT - ATTENDING COMMENTS
67F PMH HTN, OA presents after a fall 2d ago with no LOC or head trauma. In ED was too weak to ambulate. Found COVID +. Will need dispo planning    Patient was seen and examined, no new complaints. Has some knee pain     Labs reviewed     A/P:  Mechanical fall  ambulatory dysfunction  OA   COVID  HTN  UTI  CK elevation    Plan:  -patient is medically stable form DC, 24 hr notice was given yesterday. However, she is still unable to walk and lives alone. PT recommended RAMA, I spoke w/ patient extensively and explained her about benefits of RAMA and potential improvement in functional status. However, she is adamant and does not want to go RAMA. SHRUTHI to evaluate for home safety situation   -COVID infection is mild and asx  -C/w  amlodipine for htn (not on home meds)  -Complete Levaquin   -dvt ppx  -PT: RAMA  -SHRUTHI eval . 67F PMH HTN, OA presents after a fall 2d ago with no LOC or head trauma. In ED was too weak to ambulate. Found COVID +. Will need dispo planning    Patient was seen and examined, no new complaints. Has some knee pain     Labs reviewed     A/P:  Mechanical fall  ambulatory dysfunction  OA   COVID  HTN  UTI  CK elevation    Plan:  -patient is medically stable form DC, 24 hr notice was given yesterday. However, she is still unable to walk and lives alone. PT recommended RAMA, I spoke w/ patient extensively and explained her about benefits of RAMA and potential improvement in functional status. However, she is adamant and does not want to go RAMA. SW to evaluate for home safety situation   -COVID infection is mild and asx  -C/w  amlodipine for htn (not on home meds)  -Complete Levaquin   -dvt ppx  -PT: RAMA  -SHRUTHI eval .  -I spent total 35 minutes on reviewing labs, discussing case w/ patient and providing counselling regarding RAMA.

## 2023-04-05 LAB — PHOSPHATE SERPL-MCNC: 3.4 MG/DL — SIGNIFICANT CHANGE UP (ref 2.5–4.5)

## 2023-04-05 PROCEDURE — 99231 SBSQ HOSP IP/OBS SF/LOW 25: CPT | Mod: GC

## 2023-04-05 RX ORDER — LIDOCAINE 4 G/100G
1 CREAM TOPICAL
Qty: 0 | Refills: 0 | DISCHARGE
Start: 2023-04-05

## 2023-04-05 RX ORDER — LIDOCAINE 4 G/100G
1 CREAM TOPICAL DAILY
Refills: 0 | Status: DISCONTINUED | OUTPATIENT
Start: 2023-04-05 | End: 2023-04-10

## 2023-04-05 RX ORDER — AMLODIPINE BESYLATE 2.5 MG/1
1 TABLET ORAL
Qty: 0 | Refills: 0 | DISCHARGE
Start: 2023-04-05

## 2023-04-05 RX ADMIN — LIDOCAINE 1 PATCH: 4 CREAM TOPICAL at 19:39

## 2023-04-05 RX ADMIN — AMLODIPINE BESYLATE 10 MILLIGRAM(S): 2.5 TABLET ORAL at 06:50

## 2023-04-05 RX ADMIN — LIDOCAINE 1 PATCH: 4 CREAM TOPICAL at 18:12

## 2023-04-05 RX ADMIN — ENOXAPARIN SODIUM 40 MILLIGRAM(S): 100 INJECTION SUBCUTANEOUS at 06:51

## 2023-04-05 NOTE — PROGRESS NOTE ADULT - PROBLEM SELECTOR PLAN 1
s/p accidental mechanical fall at home No LOC and head trauma  CTH only chronic microvascular changes, no acute pathology  CT spine no fractures  denies any pain   BUN 48 with a normal SCr  PT consulted recommend RAMA however patient refusing and would like go to home with home PT.   patient was unable to get out of bed and stand on her feet this AM without high risk of falling. Unsafe dischrage plan for patient to go home alone. SHRUTHI/SAEED on board with assistance for safe dispo   patient is medically cleared for discharge

## 2023-04-05 NOTE — PROGRESS NOTE ADULT - ATTENDING COMMENTS
67F PMH HTN, OA presents after a fall 2d ago with no LOC or head trauma. In ED was too weak to ambulate. Found COVID +. Will need dispo planning    Patient was seen and examined, no new complaints. Has some knee pain     Lab-holiday     A/P:  Mechanical fall  ambulatory dysfunction  OA   COVID  HTN  UTI  CK elevation    Plan:  -Patient is medically stable for DC to [facility]. However, patient refused RAMA and would like to go home. She lives by herself and was able to ambulate to bathroom w/ assistance. However, there is a concern for home safety. SHRUTHI li requested who will make APS referral and evaluate.   -She has hx of OA, Rt knee w/ most pain. Would avoid systemic NSAIDs in her age group. Topical diclofenac as out-patient. Start lidocaine patch and get X-ray knee. She has seen orthopedist as out-patient. Should f/u as out-patient for further recs.   -COVID infection is mild and asx  -C/w  amlodipine for htn (not on home meds)  -Complete Levaquin   -dvt ppx  -CM/SW for safe DC plan. 67F PMH HTN, OA presents after a fall 2d ago with no LOC or head trauma. In ED was too weak to ambulate. Found COVID +. Will need dispo planning    Patient was seen and examined, no new complaints. Has some knee pain     Lab-holiday     A/P:  Mechanical fall  ambulatory dysfunction  OA   COVID  HTN  UTI  CK elevation    Plan:  -Patient is medically stable for DC to [facility]. However, patient refused RAMA and would like to go home. She lives by herself and was able to ambulate to bathroom w/ assistance. Patient initially refused RAMA, SW referral made for home safety and APS referral. However, she later agreed to RAMA. CM aware, pending choices and acceptance    -She has hx of OA, Rt knee w/ most pain. Would avoid systemic NSAIDs in her age group. Topical diclofenac as out-patient. Start lidocaine patch and get X-ray knee. She has seen orthopedist as out-patient. Should f/u as out-patient for further recs.   -COVID infection is mild and asx  -C/w  amlodipine for htn (not on home meds)  -Complete Levaquin   -dvt ppx

## 2023-04-05 NOTE — DISCHARGE NOTE PROVIDER - NSDCMRMEDTOKEN_GEN_ALL_CORE_FT
amLODIPine 10 mg oral tablet: 1 tab(s) orally once a day   amLODIPine 10 mg oral tablet: 1 tab(s) orally once a day  lidocaine 4% topical film: Apply topically to affected area once a day as needed for knee pain   amLODIPine 10 mg oral tablet: 1 tab(s) orally once a day  diclofenac 1% topical gel: Apply topically to affected area 2 times a day  lidocaine 4% topical film: Apply topically to affected area once a day as needed for knee pain

## 2023-04-05 NOTE — DISCHARGE NOTE PROVIDER - ATTENDING DISCHARGE PHYSICAL EXAMINATION:
Vital Signs Last 24 Hrs  T(C): 37.1 (10 Apr 2023 12:49), Max: 37.3 (09 Apr 2023 13:21)  T(F): 98.7 (10 Apr 2023 12:49), Max: 99.1 (09 Apr 2023 13:21)  HR: 92 (10 Apr 2023 12:49) (80 - 92)  BP: 127/64 (10 Apr 2023 12:49) (114/61 - 135/66)  BP(mean): --  RR: 18 (10 Apr 2023 12:49) (16 - 18)  SpO2: 98% (10 Apr 2023 12:49) (96% - 98%)    Parameters below as of 10 Apr 2023 12:49  Patient On (Oxygen Delivery Method): room air    PHYSICAL EXAM:  GENERAL: NAD  HEENT: Normocephalic;  conjunctivae and sclerae clear; moist mucous membranes;   NECK : supple  CHEST/LUNG: Clear to auscultation bilaterally with good air entry   HEART: S1 S2  regular; no murmurs, gallops or rubs  ABDOMEN: Soft, Nontender, Nondistended; Bowel sounds present  EXTREMITIES: no cyanosis; no edema; no calf tenderness  SKIN: warm and dry; no rash  NERVOUS SYSTEM:  Awake and alert; Oriented  to place, person and time ; no new deficits    _________________________________________________

## 2023-04-05 NOTE — DISCHARGE NOTE PROVIDER - NSDCCPCAREPLAN_GEN_ALL_CORE_FT
PRINCIPAL DISCHARGE DIAGNOSIS  Diagnosis: Ambulatory dysfunction  Assessment and Plan of Treatment: You came to the hospital after a fall at home. You had a CT scan of your head and spine done which was negative for any fractures and acute findings. Physical therapy was consulted and recommended you go to sub acute rehab which you agreed to go to. At the rehab facility they will work on helping you walk and getting you stronger.      SECONDARY DISCHARGE DIAGNOSES  Diagnosis: Acute UTI  Assessment and Plan of Treatment: You were treated with antibiotics in the hospital for your urinary tract infection. You completed the course and your symptoms are now resolved.    Diagnosis: HTN (hypertension)  Assessment and Plan of Treatment: You have high blood pressure. We started you on amlodipine 10mg daily while you were in the hospital. Please continue to take this medication daily and monitor your blood pressure when you leave.    Diagnosis: COVID  Assessment and Plan of Treatment: You were found to have covid 19 infection. You were asymptomatic and did not require any treatment or oxygen.    Diagnosis: Abnormal CT scan  Assessment and Plan of Treatment: CT scan showed a heterogenous thyroid gland, which we recommend to be followed up with a thyroid ultrasound. This can be done outpatient.  CT scan also showed nonspecific ground glass opacities at the visualized lung apices. We recoomend you follow up with your doctor when you leave the hospital to monitor this abnormal finding and to see the progress of this abnormal finding.        PRINCIPAL DISCHARGE DIAGNOSIS  Diagnosis: Ambulatory dysfunction  Assessment and Plan of Treatment: You came to the hospital after a fall at home. You had a CT scan of your head and spine done which was negative for any fractures and acute findings. Physical therapy was consulted and recommended you go to sub acute rehab which you agreed to go to. At the rehab facility they will work on helping you walk and getting you stronger.      SECONDARY DISCHARGE DIAGNOSES  Diagnosis: Acute UTI  Assessment and Plan of Treatment: You were treated with antibiotics in the hospital for your urinary tract infection. You completed the course and your symptoms are now resolved.    Diagnosis: HTN (hypertension)  Assessment and Plan of Treatment: You have high blood pressure. We started you on amlodipine 10mg daily while you were in the hospital. Please continue to take this medication daily and monitor your blood pressure when you leave.    Diagnosis: COVID  Assessment and Plan of Treatment: You were found to have covid 19 infection. You were asymptomatic and did not require any treatment or oxygen.    Diagnosis: Abnormal CT scan  Assessment and Plan of Treatment: CT scan showed a heterogenous thyroid gland, which we recommend to be followed up with a thyroid ultrasound. This can be done outpatient.  CT scan also showed nonspecific ground glass opacities at the visualized lung apices. We recoomend you follow up with your doctor when you leave the hospital to monitor this abnormal finding and to see the progress of this abnormal finding.       Diagnosis: Right knee pain  Assessment and Plan of Treatment: You have pain in your right knee likely due to arthritis. Xray of your knee showed........  We prescribed you lidocaine patch to place on your knee for pain. Please follow up with your orthopedist after leaving rehab     PRINCIPAL DISCHARGE DIAGNOSIS  Diagnosis: Ambulatory dysfunction  Assessment and Plan of Treatment: You came to the hospital after a fall at home. You had a CT scan of your head and spine done which was negative for any fractures and acute findings. Physical therapy was consulted and recommended you go to sub acute rehab which you agreed to go to. At the rehab facility they will work on helping you walk and getting you stronger.      SECONDARY DISCHARGE DIAGNOSES  Diagnosis: Acute UTI  Assessment and Plan of Treatment: You were treated with antibiotics in the hospital for your urinary tract infection. You completed the course and your symptoms are now resolved.    Diagnosis: HTN (hypertension)  Assessment and Plan of Treatment: You have high blood pressure. We started you on amlodipine 10mg daily while you were in the hospital. Please continue to take this medication daily and monitor your blood pressure when you leave.    Diagnosis: COVID  Assessment and Plan of Treatment: You were found to have covid 19 infection. You were asymptomatic and did not require any treatment or oxygen.    Diagnosis: Abnormal CT scan  Assessment and Plan of Treatment: CT scan showed a heterogenous thyroid gland, which we recommend to be followed up with a thyroid ultrasound. This can be done outpatient.  CT scan also showed nonspecific ground glass opacities at the visualized lung apices. We recoomend you follow up with your doctor when you leave the hospital to monitor this abnormal finding and to see the progress of this abnormal finding.       Diagnosis: Right knee pain  Assessment and Plan of Treatment: We prescribed you lidocaine patch to place on your knee for pain. Please follow up with your orthopedist after leaving rehab     PRINCIPAL DISCHARGE DIAGNOSIS  Diagnosis: Acute UTI  Assessment and Plan of Treatment: You were treated with antibiotics in the hospital for your urinary tract infection. You completed the course and your symptoms are now resolved.      SECONDARY DISCHARGE DIAGNOSES  Diagnosis: Osteoarthritis  Assessment and Plan of Treatment: You presented to the hospital after a fall. You have hx of osteoarthritis, Rt knee pain was also pain but no decreased range of motion or swelling. You were given lidocaine patch along w/ topical NSAID upon DC. Please fllow up with orthopedist upon DC.   -Your falls and difficulty in ambulation is secondary to arthritis. Physical therapy is recommended and you are being discharged to Sub-acute Rehab facility.    Diagnosis: HTN (hypertension)  Assessment and Plan of Treatment: You have high blood pressure. We started you on amlodipine 10mg daily while you were in the hospital. Please continue to take this medication daily and monitor your blood pressure when you leave.    Diagnosis: Abnormal CT scan  Assessment and Plan of Treatment: CT scan showed a heterogenous thyroid gland, which we recommend to be followed up with a thyroid ultrasound. This can be done outpatient.      Diagnosis: 2019 novel coronavirus disease (COVID-19)  Assessment and Plan of Treatment: You were found to have covid 19 infection. You were asymptomatic and did not require any treatment or oxygen.    Diagnosis: Acute UTI  Assessment and Plan of Treatment: You were treated with antibiotics in the hospital for your urinary tract infection. You completed the course and your symptoms are now resolved.

## 2023-04-05 NOTE — DISCHARGE NOTE PROVIDER - HOSPITAL COURSE
67F from home, ambulates with cane, PMHx HTN and Arthritis, BIBEMS being found on the floor from a fall 3 days ago. She reports that she fell over a box and was unable to get up herself, claims that for the past 3 days she has been "scooting around" but denies crawling. Claims that shes was able to slide herself to the bathroom where she would use a bucket that was on the floor.  Patient was too weak to lift herself up to the toilet.  After patient went to finish using the bathroom she states she would crawl back to her bedroom however remained on the floor as she was too weak to get up off the bed. Claims that she had a phone with her and called her boyfriend who was trying to get a  to unlock her door which he was not able to do until today. States that she has not been able to eat but reports only being able to drink water. Also reports being thirsty however denying headache. Denies HA, dizziness, lightheadedness, weakness, and fatigue prior to fall. Denies LOC and head trauma. Denies fevers, chills, and night sweats. Patient able to move all extremities and denies pain. Patient denies HA, chest pain, SOB, abdominal pain, n/v/d, and no changes in urination and bm.     patient had CT head and spine with no acute pathology. Patient was found to be COVID+ but was asymptomatic and was on room air. Patient was found to have UTI and was treated with legvaquin 250mg for 2 days. PT recommended [facility] however patient refused and would like to go home. Patient lives alone and was able to ambulate to bathroom w/ assistance. However, there is a concern for home safety. SHRUTHI li requested who will make APS referral and evaluate. 67F from home, ambulates with cane, PMHx HTN and Arthritis, BIBEMS being found on the floor from a fall 3 days ago. She reports that she fell over a box and was unable to get up herself, claims that for the past 3 days she has been "scooting around" but denies crawling. Claims that shes was able to slide herself to the bathroom where she would use a bucket that was on the floor.  Patient was too weak to lift herself up to the toilet.  After patient went to finish using the bathroom she states she would crawl back to her bedroom however remained on the floor as she was too weak to get up off the bed. Claims that she had a phone with her and called her boyfriend who was trying to get a  to unlock her door which he was not able to do until today. States that she has not been able to eat but reports only being able to drink water. Also reports being thirsty however denying headache. Denies HA, dizziness, lightheadedness, weakness, and fatigue prior to fall. Denies LOC and head trauma. Denies fevers, chills, and night sweats. Patient able to move all extremities and denies pain. Patient denies HA, chest pain, SOB, abdominal pain, n/v/d, and no changes in urination and bm.     patient had CT head and spine with no acute pathology. Patient was found to be COVID+ but was asymptomatic and was on room air. Patient was found to have UTI and was treated with Levaquin 250mg for 3 days and completed the course. PT recommended [facility] and patient is in agreement. Patient will be discharged on 10mg of amlodipine for BP and will follow up outpatient with her PCP for blood pressure monitoring.

## 2023-04-05 NOTE — CHART NOTE - NSCHARTNOTEFT_GEN_A_CORE
Reassessment:   67yFemalePatient is a 67y old  Female who presents with a chief complaint of Fall, Ambulatory Dysfunction (05 Apr 2023 15:18)      Factors impacting intake: [ ] none [ ] nausea  [ ] vomiting [ ] diarrhea [ ] constipation  [ ]chewing problems [ ] swallowing issues  [ X] other: COVID infection, HTN, Arthritis, discharge planning issues     Diet Prescription: Diet, Regular:   DASH/TLC {Sodium & Cholesterol Restricted} (04-01-23 @ 01:16)    Intake: Patient visited, breathing on room air, states "feeling well & appetite" has improved, intake of meal are 50% & tolerating, no reports of GI distress, stated no new nutrition questions or concerns, rec. c/w diet as ordered & add Ensure Compact, 4oz 1 serving BID daily. Nursing to continue feeding meal set up and encouragement with aspiration precaution. RD available.     Daily weight: 106 Lbs - 04/01/23  % Weight Change: stable    Pertinent Medications: MEDICATIONS  (STANDING):  amLODIPine   Tablet 10 milliGRAM(s) Oral daily  enoxaparin Injectable 40 milliGRAM(s) SubCutaneous every 24 hours  lidocaine   4% Patch 1 Patch Transdermal daily    MEDICATIONS  (PRN):  acetaminophen     Tablet .. 650 milliGRAM(s) Oral every 6 hours PRN Temp greater or equal to 38C (100.4F), Mild Pain (1 - 3)  ondansetron Injectable 4 milliGRAM(s) IV Push every 8 hours PRN Nausea and/or Vomiting    Pertinent Labs: 04-04 Na136 mmol/L Glu 98 mg/dL K+ 3.7 mmol/L Cr  0.58 mg/dL BUN 12 mg/dL 04-05 Phos 3.4 mg/dL 04-04 Alb 2.4 g/dL<L>     CAPILLARY BLOOD GLUCOSE    Skin: Pressure injury     Estimated Needs:   [X ] no change since previous assessment  [ ] recalculated:     Previous Nutrition Diagnosis:   [ ] Inadequate Energy Intake [X ]Inadequate Oral Intake [ ] Excessive Energy Intake   [ ] Underweight [ ] Increased Nutrient Needs [ ] Overweight/Obesity   [ ] Altered GI Function [ ] Unintended Weight Loss [ ] Food & Nutrition Related Knowledge Deficit [ ] Malnutrition     Nutrition Diagnosis is [ X] ongoing  [ ] resolved [ ] not applicable     New Nutrition Diagnosis: [ ] not applicable     Interventions: Optimal nutrition meeting >75% of energy nutrient needs via tolerated route     Recommend  [ ] Change Diet To:  [ ] Nutrition Supplement  [ ] Nutrition Support  [X ] Other: Add MVI/minerals/Vit. C 500mg BID for wound healing     Monitoring and Evaluation:   [ X] PO intake [ x ] Tolerance to diet prescription [ x ] weights [ x ] labs[ x ] follow up per protocol  [ ] other:

## 2023-04-05 NOTE — PROGRESS NOTE ADULT - PROBLEM SELECTOR PLAN 3
RVP (+) Coronavirus   asymptomatic  Isolation precautions  c/w supportive care  no chest pain, cough, sob, fevers  on RA

## 2023-04-06 LAB
ANION GAP SERPL CALC-SCNC: 7 MMOL/L — SIGNIFICANT CHANGE UP (ref 5–17)
BUN SERPL-MCNC: 18 MG/DL — SIGNIFICANT CHANGE UP (ref 7–18)
CALCIUM SERPL-MCNC: 9.2 MG/DL — SIGNIFICANT CHANGE UP (ref 8.4–10.5)
CHLORIDE SERPL-SCNC: 108 MMOL/L — SIGNIFICANT CHANGE UP (ref 96–108)
CO2 SERPL-SCNC: 29 MMOL/L — SIGNIFICANT CHANGE UP (ref 22–31)
CREAT SERPL-MCNC: 0.62 MG/DL — SIGNIFICANT CHANGE UP (ref 0.5–1.3)
EGFR: 96 ML/MIN/1.73M2 — SIGNIFICANT CHANGE UP
GLUCOSE BLDC GLUCOMTR-MCNC: 114 MG/DL — HIGH (ref 70–99)
GLUCOSE SERPL-MCNC: 98 MG/DL — SIGNIFICANT CHANGE UP (ref 70–99)
HCT VFR BLD CALC: 39.3 % — SIGNIFICANT CHANGE UP (ref 34.5–45)
HGB BLD-MCNC: 12 G/DL — SIGNIFICANT CHANGE UP (ref 11.5–15.5)
MAGNESIUM SERPL-MCNC: 2.3 MG/DL — SIGNIFICANT CHANGE UP (ref 1.6–2.6)
MCHC RBC-ENTMCNC: 24 PG — LOW (ref 27–34)
MCHC RBC-ENTMCNC: 30.5 GM/DL — LOW (ref 32–36)
MCV RBC AUTO: 78.8 FL — LOW (ref 80–100)
NRBC # BLD: 0 /100 WBCS — SIGNIFICANT CHANGE UP (ref 0–0)
PHOSPHATE SERPL-MCNC: 3.4 MG/DL — SIGNIFICANT CHANGE UP (ref 2.5–4.5)
PLATELET # BLD AUTO: 299 K/UL — SIGNIFICANT CHANGE UP (ref 150–400)
POTASSIUM SERPL-MCNC: 3.8 MMOL/L — SIGNIFICANT CHANGE UP (ref 3.5–5.3)
POTASSIUM SERPL-SCNC: 3.8 MMOL/L — SIGNIFICANT CHANGE UP (ref 3.5–5.3)
RBC # BLD: 4.99 M/UL — SIGNIFICANT CHANGE UP (ref 3.8–5.2)
RBC # FLD: 15.2 % — HIGH (ref 10.3–14.5)
SARS-COV-2 RNA SPEC QL NAA+PROBE: SIGNIFICANT CHANGE UP
SODIUM SERPL-SCNC: 144 MMOL/L — SIGNIFICANT CHANGE UP (ref 135–145)
WBC # BLD: 6.92 K/UL — SIGNIFICANT CHANGE UP (ref 3.8–10.5)
WBC # FLD AUTO: 6.92 K/UL — SIGNIFICANT CHANGE UP (ref 3.8–10.5)

## 2023-04-06 PROCEDURE — 99232 SBSQ HOSP IP/OBS MODERATE 35: CPT | Mod: GC

## 2023-04-06 RX ADMIN — LIDOCAINE 1 PATCH: 4 CREAM TOPICAL at 06:11

## 2023-04-06 RX ADMIN — ENOXAPARIN SODIUM 40 MILLIGRAM(S): 100 INJECTION SUBCUTANEOUS at 05:57

## 2023-04-06 RX ADMIN — Medication 650 MILLIGRAM(S): at 13:08

## 2023-04-06 RX ADMIN — Medication 650 MILLIGRAM(S): at 12:34

## 2023-04-06 RX ADMIN — LIDOCAINE 1 PATCH: 4 CREAM TOPICAL at 19:18

## 2023-04-06 RX ADMIN — AMLODIPINE BESYLATE 10 MILLIGRAM(S): 2.5 TABLET ORAL at 05:57

## 2023-04-06 RX ADMIN — LIDOCAINE 1 PATCH: 4 CREAM TOPICAL at 12:29

## 2023-04-06 RX ADMIN — LIDOCAINE 1 PATCH: 4 CREAM TOPICAL at 23:29

## 2023-04-06 NOTE — PROGRESS NOTE ADULT - PROBLEM SELECTOR PLAN 3
RVP (+) Coronavirus   asymptomatic  Isolation precautions  c/w supportive care PRN  no chest pain, cough, sob, fevers  on RA

## 2023-04-06 NOTE — PROGRESS NOTE ADULT - ATTENDING COMMENTS
67F PMH HTN, OA presents after a fall 2d ago with no LOC or head trauma. In ED was too weak to ambulate. Found COVID +, awaiting DC to [facility]     Patient was seen and examined, no new complaints.     Lab-holiday     A/P:  Mechanical fall  ambulatory dysfunction  OA   COVID  HTN  UTI      Plan:  -Patient is medically stable for DC to [facility]. Awaiting acceptance   -She has hx of OA, knee pain improved. Topical NSAID upon DC. F/u w/ orthopedist as out-patient   -COVID infection is mild and asx  -C/w  amlodipine for htn (not on home meds)  -Completed Levaquin   -dvt ppx.

## 2023-04-06 NOTE — DISCHARGE NOTE NURSING/CASE MANAGEMENT/SOCIAL WORK - NSDCPEFALRISK_GEN_ALL_CORE
For information on Fall & Injury Prevention, visit: [email or web address] OR  [email or web address] OR  https://www.cdc.gov/steadi/patient.html

## 2023-04-06 NOTE — PROGRESS NOTE ADULT - PROBLEM SELECTOR PLAN 1
s/p accidental mechanical fall at home No LOC and head trauma  CTH only chronic microvascular changes, no acute pathology  CT spine no fractures  denies any pain   BUN 48 with a normal SCr  PT consulted recommend [facility] however patient refusing and would like go to home with home PT. patient now changing her mind and is agreeable to go to [facility].  patient is medically cleared for discharge

## 2023-04-07 PROCEDURE — 99232 SBSQ HOSP IP/OBS MODERATE 35: CPT | Mod: GC

## 2023-04-07 RX ADMIN — LIDOCAINE 1 PATCH: 4 CREAM TOPICAL at 12:33

## 2023-04-07 RX ADMIN — ENOXAPARIN SODIUM 40 MILLIGRAM(S): 100 INJECTION SUBCUTANEOUS at 06:14

## 2023-04-07 RX ADMIN — LIDOCAINE 1 PATCH: 4 CREAM TOPICAL at 19:33

## 2023-04-07 NOTE — PROGRESS NOTE ADULT - PROBLEM SELECTOR PLAN 4
h/o HTN not on any medications, diet controlled   started on amlodipine 5  will increase to amlodipine 10   DASH Diet  Monitor BP
h/o HTN not on any medications, diet controlled   DASH Diet  Monitor BP
h/o HTN not on any medications, diet controlled   started on amlodipine 5  will increase to amlodipine 10   DASH Diet  Monitor BP
h/o HTN not on any medications, diet controlled   started on amlodipine 5  will increase to amlodipine 10   BP now better controlled with amlodipine 10mg daily   DASH Diet  Monitor BP
h/o HTN not on any medications, diet controlled   started on amlodipine 5  will increase to amlodipine 10   BP now better controlled with amlodipine 10mg daily   DASH Diet  Monitor BP

## 2023-04-07 NOTE — PROGRESS NOTE ADULT - ATTENDING COMMENTS
67F PMH HTN, OA presents after a fall 2d ago with no LOC or head trauma. In ED was too weak to ambulate. Found COVID +, awaiting DC to [facility]     Patient was seen and examined, no new complaints.     Lab-holiday     A/P:  Mechanical fall  ambulatory dysfunction  OA   COVID  HTN  UTI      Plan:  -Patient is medically stable for DC to [facility]. Awaiting acceptance to [facility], need to repeat covid test on Sunday.   -She has hx of OA, knee pain improved. Topical NSAID upon DC. F/u w/ orthopedist as out-patient   -COVID infection is mild and asx  -C/w  amlodipine for htn (not on home meds)  -C/w lidocaine patch for pain   -Completed Levaquin   -dvt ppx.

## 2023-04-07 NOTE — PROGRESS NOTE ADULT - PROBLEM SELECTOR PLAN 1
s/p accidental mechanical fall at home No LOC and head trauma  CTH only chronic microvascular changes, no acute pathology  CT spine no fractures  denies any pain   BUN 48 with a normal SCr  PT consulted recommend [facility] however patient refusing and would like go to home with home PT. patient now changing her mind and is agreeable to go to [facility]. patient needs two negative Covid 19 PCRs three days apart for rehab acceptance. last covid negative april 6. next covid test scheduled for april 9   patient is medically cleared for discharge

## 2023-04-07 NOTE — PROGRESS NOTE ADULT - PROBLEM SELECTOR PLAN 5
Lovenox   Fall precaution

## 2023-04-08 PROCEDURE — 99232 SBSQ HOSP IP/OBS MODERATE 35: CPT | Mod: GC

## 2023-04-08 RX ORDER — COLLAGENASE CLOSTRIDIUM HIST. 250 UNIT/G
1 OINTMENT (GRAM) TOPICAL DAILY
Refills: 0 | Status: DISCONTINUED | OUTPATIENT
Start: 2023-04-08 | End: 2023-04-10

## 2023-04-08 RX ADMIN — LIDOCAINE 1 PATCH: 4 CREAM TOPICAL at 23:53

## 2023-04-08 RX ADMIN — ENOXAPARIN SODIUM 40 MILLIGRAM(S): 100 INJECTION SUBCUTANEOUS at 05:31

## 2023-04-08 RX ADMIN — LIDOCAINE 1 PATCH: 4 CREAM TOPICAL at 11:52

## 2023-04-08 RX ADMIN — LIDOCAINE 1 PATCH: 4 CREAM TOPICAL at 00:07

## 2023-04-09 LAB — SARS-COV-2 RNA SPEC QL NAA+PROBE: SIGNIFICANT CHANGE UP

## 2023-04-09 PROCEDURE — 99232 SBSQ HOSP IP/OBS MODERATE 35: CPT

## 2023-04-09 RX ADMIN — LIDOCAINE 1 PATCH: 4 CREAM TOPICAL at 12:00

## 2023-04-09 RX ADMIN — LIDOCAINE 1 PATCH: 4 CREAM TOPICAL at 19:53

## 2023-04-09 RX ADMIN — ENOXAPARIN SODIUM 40 MILLIGRAM(S): 100 INJECTION SUBCUTANEOUS at 05:52

## 2023-04-09 RX ADMIN — AMLODIPINE BESYLATE 10 MILLIGRAM(S): 2.5 TABLET ORAL at 05:52

## 2023-04-09 NOTE — PROGRESS NOTE ADULT - ASSESSMENT
67F PMH HTN, OA presents after a fall 2d ago with no LOC or head trauma. In ED was too weak to ambulate. Found COVID +, awaiting DC to [facility]       A/P:  Mechanical fall  ambulatory dysfunction  OA   COVID  HTN  UTI- s/p Rx       Plan:  -Patient is medically stable for DC to [facility]. Awaiting acceptance to [facility], Covid x2 negative   -She has hx of OA, knee pain improved. Topical NSAID upon DC. F/u w/ orthopedist as out-patient   -COVID infection is mild and asx  -C/w  amlodipine for htn, BP stable.   -C/w lidocaine patch for pain   -dvt ppx.   -SW sent APS referral.   
67F from home, ambulates with cane, PMHx HTN and Arthritis, BIBEMS being found on the floor from a fall 3 days ago. CT imaging negative for acute fractures. Aseptic appearing, no WBC and VSS. CK mildly elevated but with normal kidney function and mild microscopic hematuria. Found to have Coronavirus (+) but remains asymptomatic. Admitted for further monitoring and PT evaluation for unsafe discharge disposition 
67F PMH HTN, OA presents after a fall 2d ago with no LOC or head trauma. In ED was too weak to ambulate. Found COVID +. Will need dispo planning    Mechanical fall  ambulatory dysfunction  COVID  HTN  CK elevation    -appreicate PT eval  -CK elevated but no HESHAM  -monitor Cr  -COVID infection is mild and asx  -starting amlodipine for htn (not on home meds)  -dvt ppx  -SW eval
67F PMH HTN, OA presents after a fall 2d ago with no LOC or head trauma. In ED was too weak to ambulate. Found COVID +, awaiting DC to [facility]     Patient was seen and examined, no new complaints.     Lab-holiday     A/P:  Mechanical fall  ambulatory dysfunction  OA   COVID  HTN  UTI- s/p Rx       Plan:  -Patient is medically stable for DC to [facility]. Awaiting acceptance to [facility], need to repeat covid test on Sunday.   -She has hx of OA, knee pain improved. Topical NSAID upon DC. F/u w/ orthopedist as out-patient   -COVID infection is mild and asx  -C/w  amlodipine for htn, BP stable.   -C/w lidocaine patch for pain   -dvt ppx.   
67F PMH HTN, OA presents after a fall 2d ago with no LOC or head trauma. In ED was too weak to ambulate. Found COVID +. Will need dispo planning    Mechanical fall  ambulatory dysfunction  COVID  HTN  CK elevation    -CK elevated but no HESHAM  -monitor Cr  -COVID infection is mild and asx  -starting amlodipine for htn (not on home meds)  -dvt ppx  -PT: [facility]  -SHRUTHI li
67F from home, ambulates with cane, PMHx HTN and Arthritis, BIBEMS being found on the floor from a fall 3 days ago. CT imaging negative for acute fractures. Aseptic appearing, no WBC and VSS. CK mildly elevated but with normal kidney function and mild microscopic hematuria. Found to have Coronavirus (+) but remains asymptomatic. Admitted for further monitoring and PT evaluation for unsafe discharge disposition 

## 2023-04-09 NOTE — PROGRESS NOTE ADULT - SUBJECTIVE AND OBJECTIVE BOX
Interval of present illness: No acute events overnight. Pt seen at bedside. No complaints.    REVIEW OF SYSTEMS:    CONSTITUTIONAL: No fever  EYES: No acute visual disturbances  NECK: No pain or stiffness  RESPIRATORY: No cough; No shortness of breath  CARDIOVASCULAR: No chest pain, no palpitations  GASTROINTESTINAL: No pain. No nausea or vomiting.  No diarrhea   NEUROLOGICAL: No headache or numbness, no tremors  MUSCULOSKELETAL: no muscle pain  GENITOURINARY: No dysuria, no frequency, no hesitancy  PSYCHIATRY: No depression , no anxiety  ALL OTHER  ROS negative     O:  Vital Signs Last 24 Hrs  T(C): 37.8 (01 Apr 2023 05:31), Max: 37.8 (01 Apr 2023 05:31)  T(F): 100.1 (01 Apr 2023 05:31), Max: 100.1 (01 Apr 2023 05:31)  HR: 95 (01 Apr 2023 10:41) (91 - 116)  BP: 148/84 (01 Apr 2023 10:41) (136/66 - 166/92)  BP(mean): 94 (31 Mar 2023 23:18) (94 - 94)  RR: 16 (01 Apr 2023 05:31) (16 - 18)  SpO2: 95% (01 Apr 2023 10:41) (93% - 98%)    Parameters below as of 01 Apr 2023 10:41  Patient On (Oxygen Delivery Method): room air        Gen: NAD, laying in bed, pleasant  Neuro: alert, answering qs appropriately  HEENT: anicteric, moist oral mucosa  Neck: supple, no JVD elevation  Cards: RRR  Pulm: good inspiratory effort, CTAB  Abd: soft, NT/ND  Ext: no edema  Skin: warm, dry      acetaminophen     Tablet .. 650 milliGRAM(s) Oral every 6 hours PRN  enoxaparin Injectable 40 milliGRAM(s) SubCutaneous every 24 hours  ondansetron Injectable 4 milliGRAM(s) IV Push every 8 hours PRN  potassium phosphate / sodium phosphate Powder (PHOS-NaK) 1 Packet(s) Oral once  sodium chloride 0.9%. 1000 milliLiter(s) IV Continuous <Continuous>                            11.6   8.52  )-----------( 156      ( 01 Apr 2023 05:20 )             37.9       04-01    138  |  107  |  31<H>  ----------------------------<  92  3.3<L>   |  24  |  0.69    Ca    8.4      01 Apr 2023 05:20  Phos  1.9     04-01  Mg     2.3     04-01    TPro  6.6  /  Alb  2.5<L>  /  TBili  0.6  /  DBili  x   /  AST  50<H>  /  ALT  22  /  AlkPhos  62  04-01  
PGY-2 Progress Note discussed with attending    PAGER #: [[phone]] TILL 5:00 PM  PLEASE CONTACT ON CALL TEAM:  - On Call Team (Please refer to Martina) FROM 5:00 PM - 8:30PM  - Nightfloat Team FROM 8:30 -7:30 AM    INTERVAL HPI/ OVERNIGHT EVENTS: No acute events overnight. Patient reports improvement in her symptoms.       REVIEW OF SYSTEMS:  CONSTITUTIONAL: No fever, weight loss, or fatigue  RESPIRATORY: No cough, wheezing, chills or hemoptysis; No shortness of breath  CARDIOVASCULAR: No chest pain, palpitations, dizziness, or leg swelling  GASTROINTESTINAL: No abdominal pain. No nausea, vomiting, or hematemesis; No diarrhea or constipation. No melena or hematochezia.  GENITOURINARY: No dysuria or hematuria, urinary frequency  NEUROLOGICAL: No headaches, memory loss, loss of strength, numbness, or tremors  SKIN: No itching, burning, rashes, or lesions     MEDICATIONS  (STANDING):  amLODIPine   Tablet 5 milliGRAM(s) Oral daily  enoxaparin Injectable 40 milliGRAM(s) SubCutaneous every 24 hours  levoFLOXacin  Tablet 250 milliGRAM(s) Oral every 24 hours  sodium chloride 0.9%. 1000 milliLiter(s) (100 mL/Hr) IV Continuous <Continuous>    MEDICATIONS  (PRN):  acetaminophen     Tablet .. 650 milliGRAM(s) Oral every 6 hours PRN Temp greater or equal to 38C (100.4F), Mild Pain (1 - 3)  ondansetron Injectable 4 milliGRAM(s) IV Push every 8 hours PRN Nausea and/or Vomiting      Vital Signs Last 24 Hrs  T(C): 37.1 (03 Apr 2023 13:47), Max: 37.5 (02 Apr 2023 20:00)  T(F): 98.7 (03 Apr 2023 13:47), Max: 99.5 (02 Apr 2023 20:00)  HR: 91 (03 Apr 2023 13:47) (87 - 91)  BP: 146/84 (03 Apr 2023 13:47) (146/84 - 166/88)  BP(mean): --  RR: 16 (03 Apr 2023 13:47) (16 - 18)  SpO2: 95% (03 Apr 2023 13:47) (95% - 98%)    Parameters below as of 03 Apr 2023 13:47  Patient On (Oxygen Delivery Method): room air        PHYSICAL EXAMINATION:  GENERAL: NAD, AAOx3  HEAD: AT/NC  EYES: conjunctiva and sclera clear  NECK: supple, No JVD noted, Normal thyroid  CHEST/LUNG: CTABL; no rales, rhonchi, wheezing, or rubs  HEART: regular rate and rhythm; no murmurs, rubs, or gallops  ABDOMEN: soft, nontender, nondistended; Bowel sounds present  EXTREMITIES:  2+ Peripheral Pulses, No clubbing, cyanosis, or edema  SKIN: warm dry                          11.2   6.33  )-----------( 197      ( 03 Apr 2023 06:42 )             36.6     04-03    141  |  108  |  11  ----------------------------<  97  3.4<L>   |  27  |  0.48<L>    Ca    8.8      03 Apr 2023 06:42  Phos  2.0     04-03  Mg     2.1     04-03    TPro  6.5  /  Alb  2.3<L>  /  TBili  0.4  /  DBili  x   /  AST  33  /  ALT  20  /  AlkPhos  66  04-03    LIVER FUNCTIONS - ( 03 Apr 2023 06:42 )  Alb: 2.3 g/dL / Pro: 6.5 g/dL / ALK PHOS: 66 U/L / ALT: 20 U/L DA / AST: 33 U/L / GGT: x                 SARS-CoV-2: Detected (31 Mar 2023 22:35)      CAPILLARY BLOOD GLUCOSE          RADIOLOGY & ADDITIONAL TESTS:                  
    Patient is a 67y old  Female who presents with a chief complaint of Fall, Ambulatory Dysfunction (07 Apr 2023 11:34)      INTERVAL HPI/OVERNIGHT EVENTS: no events noted overnight. Pt feels well     MEDICATIONS  (STANDING):  amLODIPine   Tablet 10 milliGRAM(s) Oral daily  enoxaparin Injectable 40 milliGRAM(s) SubCutaneous every 24 hours  lidocaine   4% Patch 1 Patch Transdermal daily    MEDICATIONS  (PRN):  acetaminophen     Tablet .. 650 milliGRAM(s) Oral every 6 hours PRN Temp greater or equal to 38C (100.4F), Mild Pain (1 - 3)  collagenase Ointment 1 Application(s) Topical daily PRN dressing change  ondansetron Injectable 4 milliGRAM(s) IV Push every 8 hours PRN Nausea and/or Vomiting      __________________________________________________  REVIEW OF SYSTEMS:    CONSTITUTIONAL: No fever,   EYES: no acute visual disturbances  NECK: No pain or stiffness  RESPIRATORY: No cough; No shortness of breath  CARDIOVASCULAR: No chest pain, no palpitations  GASTROINTESTINAL: No pain. No nausea or vomiting; No diarrhea   NEUROLOGICAL: No headache or numbness, no tremors  MUSCULOSKELETAL: No joint pain, no muscle pain  GENITOURINARY: no dysuria, no frequency, no hesitancy  PSYCHIATRY: no depression , no anxiety  ALL OTHER  ROS negative        Vital Signs Last 24 Hrs  T(C): 36.7 (08 Apr 2023 11:57), Max: 37.4 (07 Apr 2023 19:56)  T(F): 98.1 (08 Apr 2023 11:57), Max: 99.3 (07 Apr 2023 19:56)  HR: 89 (08 Apr 2023 11:57) (87 - 94)  BP: 126/60 (08 Apr 2023 11:57) (116/63 - 134/82)  BP(mean): --  RR: 16 (08 Apr 2023 11:57) (16 - 18)  SpO2: 96% (08 Apr 2023 11:57) (96% - 97%)    Parameters below as of 08 Apr 2023 11:57  Patient On (Oxygen Delivery Method): room air        ________________________________________________  PHYSICAL EXAM:  GENERAL: NAD  HEENT: Normocephalic;  conjunctivae and sclerae clear; moist mucous membranes;   NECK : supple  CHEST/LUNG: Clear to auscultation bilaterally with good air entry   HEART: S1 S2  regular; no murmurs, gallops or rubs  ABDOMEN: Soft, Nontender, Nondistended; Bowel sounds present  EXTREMITIES: no cyanosis; no edema; no calf tenderness  SKIN: warm and dry; no rash  NERVOUS SYSTEM:  Awake and alert; Oriented  to place, person and time ; no new deficits    _________________________________________________  LABS:              CAPILLARY BLOOD GLUCOSE            RADIOLOGY & ADDITIONAL TESTS:    Imaging Personally Reviewed:  YES    Consultant(s) Notes Reviewed:   YES    Care Discussed with Consultants : YES     Plan of care was discussed with patient and /or primary care giver; all questions and concerns were addressed and care was aligned with patient's wishes.    
    Patient is a 67y old  Female who presents with a chief complaint of Fall, Ambulatory Dysfunction (08 Apr 2023 14:23)      INTERVAL HPI/OVERNIGHT EVENTS: no events noted overnight. Pt feels well, no new complaints     MEDICATIONS  (STANDING):  amLODIPine   Tablet 10 milliGRAM(s) Oral daily  enoxaparin Injectable 40 milliGRAM(s) SubCutaneous every 24 hours  lidocaine   4% Patch 1 Patch Transdermal daily    MEDICATIONS  (PRN):  acetaminophen     Tablet .. 650 milliGRAM(s) Oral every 6 hours PRN Temp greater or equal to 38C (100.4F), Mild Pain (1 - 3)  collagenase Ointment 1 Application(s) Topical daily PRN dressing change  ondansetron Injectable 4 milliGRAM(s) IV Push every 8 hours PRN Nausea and/or Vomiting      __________________________________________________  REVIEW OF SYSTEMS:    CONSTITUTIONAL: No fever,   EYES: no acute visual disturbances  NECK: No pain or stiffness  RESPIRATORY: No cough; No shortness of breath  CARDIOVASCULAR: No chest pain, no palpitations  GASTROINTESTINAL: No pain. No nausea or vomiting; No diarrhea   NEUROLOGICAL: No headache or numbness, no tremors  MUSCULOSKELETAL: No joint pain, no muscle pain  GENITOURINARY: no dysuria, no frequency, no hesitancy  PSYCHIATRY: no depression , no anxiety  ALL OTHER  ROS negative        Vital Signs Last 24 Hrs  T(C): 37.3 (09 Apr 2023 13:21), Max: 37.3 (09 Apr 2023 13:21)  T(F): 99.1 (09 Apr 2023 13:21), Max: 99.1 (09 Apr 2023 13:21)  HR: 88 (09 Apr 2023 13:21) (88 - 88)  BP: 114/61 (09 Apr 2023 13:21) (114/61 - 156/81)  BP(mean): --  RR: 16 (09 Apr 2023 13:21) (16 - 18)  SpO2: 96% (09 Apr 2023 13:21) (94% - 100%)    Parameters below as of 09 Apr 2023 13:21  Patient On (Oxygen Delivery Method): room air        ________________________________________________  PHYSICAL EXAM:  GENERAL: NAD  HEENT: Normocephalic;  conjunctivae and sclerae clear; moist mucous membranes;   NECK : supple  CHEST/LUNG: Clear to auscultation bilaterally with good air entry   HEART: S1 S2  regular; no murmurs, gallops or rubs  ABDOMEN: Soft, Nontender, Nondistended; Bowel sounds present  EXTREMITIES: no cyanosis; no edema; no calf tenderness  SKIN: warm and dry; no rash  NERVOUS SYSTEM:  Awake and alert; Oriented  to place, person and time ; no new deficits    _________________________________________________  LABS:              CAPILLARY BLOOD GLUCOSE            RADIOLOGY & ADDITIONAL TESTS:    Imaging Personally Reviewed:  YES    Consultant(s) Notes Reviewed:   YES    Care Discussed with Consultants : YES     Plan of care was discussed with patient and /or primary care giver; all questions and concerns were addressed and care was aligned with patient's wishes.    
Interval of present illness: No acute events overnight. Pt seen at bedside. No complaints.    REVIEW OF SYSTEMS:    CONSTITUTIONAL: No fever  EYES: No acute visual disturbances  NECK: No pain or stiffness  RESPIRATORY: No cough; No shortness of breath  CARDIOVASCULAR: No chest pain, no palpitations  GASTROINTESTINAL: No pain. No nausea or vomiting.  No diarrhea   NEUROLOGICAL: No headache or numbness, no tremors  MUSCULOSKELETAL: no muscle pain  GENITOURINARY: No dysuria, no frequency, no hesitancy  PSYCHIATRY: No depression , no anxiety  ALL OTHER  ROS negative     O:  Vital Signs Last 24 Hrs  T(C): 37.8 (01 Apr 2023 05:31), Max: 37.8 (01 Apr 2023 05:31)  T(F): 100.1 (01 Apr 2023 05:31), Max: 100.1 (01 Apr 2023 05:31)  HR: 95 (01 Apr 2023 10:41) (91 - 116)  BP: 148/84 (01 Apr 2023 10:41) (136/66 - 166/92)  BP(mean): 94 (31 Mar 2023 23:18) (94 - 94)  RR: 16 (01 Apr 2023 05:31) (16 - 18)  SpO2: 95% (01 Apr 2023 10:41) (93% - 98%)    Parameters below as of 01 Apr 2023 10:41  Patient On (Oxygen Delivery Method): room air        Gen: NAD, laying in bed, pleasant  Neuro: alert, answering qs appropriately  HEENT: anicteric, moist oral mucosa  Neck: supple, no JVD elevation  Cards: RRR  Pulm: good inspiratory effort, CTAB  Abd: soft, NT/ND  Ext: no edema  Skin: warm, dry      acetaminophen     Tablet .. 650 milliGRAM(s) Oral every 6 hours PRN  enoxaparin Injectable 40 milliGRAM(s) SubCutaneous every 24 hours  ondansetron Injectable 4 milliGRAM(s) IV Push every 8 hours PRN  potassium phosphate / sodium phosphate Powder (PHOS-NaK) 1 Packet(s) Oral once  sodium chloride 0.9%. 1000 milliLiter(s) IV Continuous <Continuous>                            11.6   8.52  )-----------( 156      ( 01 Apr 2023 05:20 )             37.9       04-01    138  |  107  |  31<H>  ----------------------------<  92  3.3<L>   |  24  |  0.69    Ca    8.4      01 Apr 2023 05:20  Phos  1.9     04-01  Mg     2.3     04-01    TPro  6.6  /  Alb  2.5<L>  /  TBili  0.6  /  DBili  x   /  AST  50<H>  /  ALT  22  /  AlkPhos  62  04-01  
PGY-2 Progress Note discussed with attending    PAGER #: [[phone]] TILL 5:00 PM  PLEASE CONTACT ON CALL TEAM:  - On Call Team (Please refer to Martina) FROM 5:00 PM - 8:30PM  - Nightfloat Team FROM 8:30 -7:30 AM    INTERVAL HPI/ OVERNIGHT EVENTS: Patient evaluated at bedside this AM, reports no complaints.       REVIEW OF SYSTEMS:  CONSTITUTIONAL: No fever, weight loss, or fatigue  RESPIRATORY: No cough, wheezing, chills or hemoptysis; No shortness of breath  CARDIOVASCULAR: No chest pain, palpitations, dizziness, or leg swelling  GASTROINTESTINAL: No abdominal pain. No nausea, vomiting, or hematemesis; No diarrhea or constipation. No melena or hematochezia.  GENITOURINARY: No dysuria or hematuria, urinary frequency  NEUROLOGICAL: No headaches, memory loss, loss of strength, numbness, or tremors  SKIN: No itching, burning, rashes, or lesions     MEDICATIONS  (STANDING):  amLODIPine   Tablet 10 milliGRAM(s) Oral daily  enoxaparin Injectable 40 milliGRAM(s) SubCutaneous every 24 hours  lidocaine   4% Patch 1 Patch Transdermal daily    MEDICATIONS  (PRN):  acetaminophen     Tablet .. 650 milliGRAM(s) Oral every 6 hours PRN Temp greater or equal to 38C (100.4F), Mild Pain (1 - 3)  ondansetron Injectable 4 milliGRAM(s) IV Push every 8 hours PRN Nausea and/or Vomiting      Vital Signs Last 24 Hrs  T(C): 36.7 (07 Apr 2023 05:25), Max: 37.1 (06 Apr 2023 13:25)  T(F): 98.1 (07 Apr 2023 05:25), Max: 98.8 (06 Apr 2023 21:18)  HR: 85 (07 Apr 2023 05:25) (85 - 95)  BP: 128/67 (07 Apr 2023 05:25) (116/59 - 129/69)  BP(mean): --  RR: 16 (07 Apr 2023 05:25) (16 - 18)  SpO2: 95% (07 Apr 2023 05:25) (95% - 98%)    Parameters below as of 07 Apr 2023 05:25  Patient On (Oxygen Delivery Method): room air        PHYSICAL EXAMINATION:  GENERAL: NAD, AAOx3  HEAD: AT/NC  EYES: conjunctiva and sclera clear  NECK: supple, No JVD noted, Normal thyroid  CHEST/LUNG: CTABL; no rales, rhonchi, wheezing, or rubs  HEART: regular rate and rhythm; no murmurs, rubs, or gallops  ABDOMEN: soft, nontender, nondistended; Bowel sounds present  EXTREMITIES:  2+ Peripheral Pulses, No clubbing, cyanosis, or edema  SKIN: warm dry                          12.0   6.92  )-----------( 299      ( 06 Apr 2023 07:50 )             39.3     04-06    144  |  108  |  18  ----------------------------<  98  3.8   |  29  |  0.62    Ca    9.2      06 Apr 2023 07:50  Phos  3.4     04-06  Mg     2.3     04-06              COVID-19 PCR: NotDetec (06 Apr 2023 10:30)  SARS-CoV-2: Detected (31 Mar 2023 22:35)      CAPILLARY BLOOD GLUCOSE      POCT Blood Glucose.: 114 mg/dL (06 Apr 2023 20:53)      RADIOLOGY & ADDITIONAL TESTS:                  
PGY-2 Progress Note discussed with attending    PAGER #: [[phone]] TILL 5:00 PM  PLEASE CONTACT ON CALL TEAM:  - On Call Team (Please refer to Martina) FROM 5:00 PM - 8:30PM  - Nightfloat Team FROM 8:30 -7:30 AM    INTERVAL HPI/ OVERNIGHT EVENTS: No acute events overnight. Patient resting comfortably in bed. Denies any complaints.       REVIEW OF SYSTEMS:  CONSTITUTIONAL: No fever, weight loss, or fatigue  RESPIRATORY: No cough, wheezing, chills or hemoptysis; No shortness of breath  CARDIOVASCULAR: No chest pain, palpitations, dizziness, or leg swelling  GASTROINTESTINAL: No abdominal pain. No nausea, vomiting, or hematemesis; No diarrhea or constipation. No melena or hematochezia.  GENITOURINARY: No dysuria or hematuria, urinary frequency  NEUROLOGICAL: No headaches, memory loss, loss of strength, numbness, or tremors  SKIN: No itching, burning, rashes, or lesions     MEDICATIONS  (STANDING):  amLODIPine   Tablet 10 milliGRAM(s) Oral daily  enoxaparin Injectable 40 milliGRAM(s) SubCutaneous every 24 hours  lidocaine   4% Patch 1 Patch Transdermal daily    MEDICATIONS  (PRN):  acetaminophen     Tablet .. 650 milliGRAM(s) Oral every 6 hours PRN Temp greater or equal to 38C (100.4F), Mild Pain (1 - 3)  ondansetron Injectable 4 milliGRAM(s) IV Push every 8 hours PRN Nausea and/or Vomiting      Vital Signs Last 24 Hrs  T(C): 37.1 (06 Apr 2023 13:25), Max: 37.1 (06 Apr 2023 13:25)  T(F): 98.7 (06 Apr 2023 13:25), Max: 98.7 (06 Apr 2023 13:25)  HR: 95 (06 Apr 2023 13:25) (84 - 95)  BP: 116/59 (06 Apr 2023 13:25) (115/68 - 121/68)  BP(mean): --  RR: 16 (06 Apr 2023 13:25) (16 - 17)  SpO2: 96% (06 Apr 2023 13:25) (96% - 98%)    Parameters below as of 06 Apr 2023 13:25  Patient On (Oxygen Delivery Method): room air        PHYSICAL EXAMINATION:  GENERAL: NAD, AAOx3  HEAD: AT/NC  EYES: conjunctiva and sclera clear  NECK: supple, No JVD noted, Normal thyroid  CHEST/LUNG: CTABL; no rales, rhonchi, wheezing, or rubs  HEART: regular rate and rhythm; no murmurs, rubs, or gallops  ABDOMEN: soft, nontender, nondistended; Bowel sounds present  EXTREMITIES:  2+ Peripheral Pulses, No clubbing, cyanosis, or edema  SKIN: warm dry                          12.0   6.92  )-----------( 299      ( 06 Apr 2023 07:50 )             39.3     04-06    144  |  108  |  18  ----------------------------<  98  3.8   |  29  |  0.62    Ca    9.2      06 Apr 2023 07:50  Phos  3.4     04-06  Mg     2.3     04-06              COVID-19 PCR: NotDetec (06 Apr 2023 10:30)  SARS-CoV-2: Detected (31 Mar 2023 22:35)      CAPILLARY BLOOD GLUCOSE          RADIOLOGY & ADDITIONAL TESTS:                  
PGY-2 Progress Note discussed with attending    PAGER #: [[phone]] TILL 5:00 PM  PLEASE CONTACT ON CALL TEAM:  - On Call Team (Please refer to Martina) FROM 5:00 PM - 8:30PM  - Nightfloat Team FROM 8:30 -7:30 AM    INTERVAL HPI/ OVERNIGHT EVENTS: No acute events overnight. Patient resting comfortably at bedside. States that she is feeling tired. Encouraged patient to get out of bed and into chair.      REVIEW OF SYSTEMS:  CONSTITUTIONAL: No fever, weight loss, or fatigue  RESPIRATORY: No cough, wheezing, chills or hemoptysis; No shortness of breath  CARDIOVASCULAR: No chest pain, palpitations, dizziness, or leg swelling  GASTROINTESTINAL: No abdominal pain. No nausea, vomiting, or hematemesis; No diarrhea or constipation. No melena or hematochezia.  GENITOURINARY: No dysuria or hematuria, urinary frequency  NEUROLOGICAL: No headaches, memory loss, loss of strength, numbness, or tremors  SKIN: No itching, burning, rashes, or lesions     MEDICATIONS  (STANDING):  amLODIPine   Tablet 5 milliGRAM(s) Oral daily  enoxaparin Injectable 40 milliGRAM(s) SubCutaneous every 24 hours  levoFLOXacin  Tablet 250 milliGRAM(s) Oral every 24 hours  sodium chloride 0.9%. 1000 milliLiter(s) (100 mL/Hr) IV Continuous <Continuous>    MEDICATIONS  (PRN):  acetaminophen     Tablet .. 650 milliGRAM(s) Oral every 6 hours PRN Temp greater or equal to 38C (100.4F), Mild Pain (1 - 3)  ondansetron Injectable 4 milliGRAM(s) IV Push every 8 hours PRN Nausea and/or Vomiting      Vital Signs Last 24 Hrs  T(C): 36.6 (04 Apr 2023 12:03), Max: 37.4 (03 Apr 2023 21:20)  T(F): 97.9 (04 Apr 2023 12:03), Max: 99.3 (03 Apr 2023 21:20)  HR: 97 (04 Apr 2023 12:03) (86 - 97)  BP: 141/79 (04 Apr 2023 12:03) (141/79 - 161/86)  BP(mean): --  RR: 16 (04 Apr 2023 12:03) (16 - 16)  SpO2: 100% (04 Apr 2023 12:03) (96% - 100%)    Parameters below as of 04 Apr 2023 04:35  Patient On (Oxygen Delivery Method): room air        PHYSICAL EXAMINATION:  GENERAL: NAD, AAOx3  HEAD: AT/NC  EYES: conjunctiva and sclera clear  NECK: supple, No JVD noted, Normal thyroid  CHEST/LUNG: CTABL; no rales, rhonchi, wheezing, or rubs  HEART: regular rate and rhythm; no murmurs, rubs, or gallops  ABDOMEN: soft, nontender, nondistended; Bowel sounds present  EXTREMITIES:  2+ Peripheral Pulses, No clubbing, cyanosis, or edema  SKIN: warm dry                          11.9   6.46  )-----------( 239      ( 04 Apr 2023 07:18 )             39.3     04-04    136  |  107  |  12  ----------------------------<  98  3.7   |  27  |  0.58    Ca    9.2      04 Apr 2023 07:18  Phos  2.4     04-04  Mg     2.1     04-04    TPro  6.7  /  Alb  2.4<L>  /  TBili  0.4  /  DBili  x   /  AST  25  /  ALT  20  /  AlkPhos  67  04-04    LIVER FUNCTIONS - ( 04 Apr 2023 07:18 )  Alb: 2.4 g/dL / Pro: 6.7 g/dL / ALK PHOS: 67 U/L / ALT: 20 U/L DA / AST: 25 U/L / GGT: x                 SARS-CoV-2: Detected (31 Mar 2023 22:35)      CAPILLARY BLOOD GLUCOSE          RADIOLOGY & ADDITIONAL TESTS:                  
PGY-2 Progress Note discussed with attending    PAGER #: [[phone]] TILL 5:00 PM  PLEASE CONTACT ON CALL TEAM:  - On Call Team (Please refer to Martina) FROM 5:00 PM - 8:30PM  - Nightfloat Team FROM 8:30 -7:30 AM    INTERVAL HPI/ OVERNIGHT EVENTS: Attempted to ambulate patient today but patient was unable to stand on her feet. Patient is a fall risk. Patient uses cane at home but seems to have deconditioned during this hospitalization.      REVIEW OF SYSTEMS:  CONSTITUTIONAL: No fever, weight loss, or fatigue  RESPIRATORY: No cough, wheezing, chills or hemoptysis; No shortness of breath  CARDIOVASCULAR: No chest pain, palpitations, dizziness, or leg swelling  GASTROINTESTINAL: No abdominal pain. No nausea, vomiting, or hematemesis; No diarrhea or constipation. No melena or hematochezia.  GENITOURINARY: No dysuria or hematuria, urinary frequency  NEUROLOGICAL: No headaches, memory loss, loss of strength, numbness, or tremors  SKIN: No itching, burning, rashes, or lesions     MEDICATIONS  (STANDING):  amLODIPine   Tablet 10 milliGRAM(s) Oral daily  enoxaparin Injectable 40 milliGRAM(s) SubCutaneous every 24 hours  levoFLOXacin  Tablet 250 milliGRAM(s) Oral every 24 hours  sodium chloride 0.9%. 1000 milliLiter(s) (100 mL/Hr) IV Continuous <Continuous>    MEDICATIONS  (PRN):  acetaminophen     Tablet .. 650 milliGRAM(s) Oral every 6 hours PRN Temp greater or equal to 38C (100.4F), Mild Pain (1 - 3)  ondansetron Injectable 4 milliGRAM(s) IV Push every 8 hours PRN Nausea and/or Vomiting      Vital Signs Last 24 Hrs  T(C): 36.8 (05 Apr 2023 05:55), Max: 37.4 (04 Apr 2023 20:55)  T(F): 98.2 (05 Apr 2023 05:55), Max: 99.3 (04 Apr 2023 20:55)  HR: 90 (05 Apr 2023 05:55) (90 - 97)  BP: 135/84 (05 Apr 2023 05:55) (135/84 - 164/86)  BP(mean): --  RR: 16 (05 Apr 2023 05:55) (16 - 17)  SpO2: 97% (05 Apr 2023 05:55) (97% - 97%)    Parameters below as of 05 Apr 2023 05:55  Patient On (Oxygen Delivery Method): room air        PHYSICAL EXAMINATION:  GENERAL: NAD, AAOx3  HEAD: AT/NC  EYES: conjunctiva and sclera clear  NECK: supple, No JVD noted, Normal thyroid  CHEST/LUNG: CTABL; no rales, rhonchi, wheezing, or rubs  HEART: regular rate and rhythm; no murmurs, rubs, or gallops  ABDOMEN: soft, nontender, nondistended; Bowel sounds present  EXTREMITIES:  2+ Peripheral Pulses, No clubbing, cyanosis, or edema  SKIN: warm dry                          11.9   6.46  )-----------( 239      ( 04 Apr 2023 07:18 )             39.3     04-04    136  |  107  |  12  ----------------------------<  98  3.7   |  27  |  0.58    Ca    9.2      04 Apr 2023 07:18  Phos  3.4     04-05  Mg     2.1     04-04    TPro  6.7  /  Alb  2.4<L>  /  TBili  0.4  /  DBili  x   /  AST  25  /  ALT  20  /  AlkPhos  67  04-04    LIVER FUNCTIONS - ( 04 Apr 2023 07:18 )  Alb: 2.4 g/dL / Pro: 6.7 g/dL / ALK PHOS: 67 U/L / ALT: 20 U/L DA / AST: 25 U/L / GGT: x                 SARS-CoV-2: Detected (31 Mar 2023 22:35)      CAPILLARY BLOOD GLUCOSE          RADIOLOGY & ADDITIONAL TESTS:

## 2023-04-09 NOTE — PROGRESS NOTE ADULT - REASON FOR ADMISSION
Fall, Ambulatory Dysfunction

## 2023-04-10 VITALS
SYSTOLIC BLOOD PRESSURE: 127 MMHG | DIASTOLIC BLOOD PRESSURE: 64 MMHG | RESPIRATION RATE: 18 BRPM | TEMPERATURE: 99 F | HEART RATE: 92 BPM | OXYGEN SATURATION: 98 %

## 2023-04-10 RX ORDER — DICLOFENAC SODIUM 30 MG/G
2 GEL TOPICAL
Qty: 1 | Refills: 0
Start: 2023-04-10 | End: 2023-05-09

## 2023-04-10 RX ADMIN — ENOXAPARIN SODIUM 40 MILLIGRAM(S): 100 INJECTION SUBCUTANEOUS at 05:42

## 2023-04-10 RX ADMIN — AMLODIPINE BESYLATE 10 MILLIGRAM(S): 2.5 TABLET ORAL at 05:42

## 2023-04-10 RX ADMIN — LIDOCAINE 1 PATCH: 4 CREAM TOPICAL at 11:30

## 2023-04-10 RX ADMIN — LIDOCAINE 1 PATCH: 4 CREAM TOPICAL at 00:00

## 2023-04-10 NOTE — CHART NOTE - NSCHARTNOTEFT_GEN_A_CORE
Reassessment:     Patient is a 67y old  Female who presents with a chief complaint of Fall, Ambulatory Dysfunction (09 Apr 2023 14:33)      Factors impacting intake: [ ] none [ ] nausea  [ ] vomiting [ ] diarrhea [ ] constipation  [ ]chewing problems [ ] swallowing issues  [ ] other: COVID infection    Diet Prescription: Diet, Regular:   DASH/TLC {Sodium & Cholesterol Restricted} (04-01-23 @ 01:16)    Intake: Visited patient in am, reports of good po intake, tolerating >50% of meals with no GI/distress, also possible d/c to Rehab. today? rec. if not d/trisha c/w diet as ordered, d/w nursing/PCA. RD available.     Daily weight: nursing to monitor daily weights  % Weight Change    Pertinent Medications: MEDICATIONS  (STANDING):  amLODIPine   Tablet 10 milliGRAM(s) Oral daily  enoxaparin Injectable 40 milliGRAM(s) SubCutaneous every 24 hours  lidocaine   4% Patch 1 Patch Transdermal daily    MEDICATIONS  (PRN):  acetaminophen     Tablet .. 650 milliGRAM(s) Oral every 6 hours PRN Temp greater or equal to 38C (100.4F), Mild Pain (1 - 3)  collagenase Ointment 1 Application(s) Topical daily PRN dressing change  ondansetron Injectable 4 milliGRAM(s) IV Push every 8 hours PRN Nausea and/or Vomiting    Pertinent Labs:  04-06 Phos 3.4 mg/dL 04-04 Alb 2.4 g/dL<L>     CAPILLARY BLOOD GLUCOSE        Skin: intact    Estimated Needs:   [ X] no change since previous assessment  [ ] recalculated:     Previous Nutrition Diagnosis:   [ ] Inadequate Energy Intake [X ]Inadequate Oral Intake [ ] Excessive Energy Intake   [ ] Underweight [ ] Increased Nutrient Needs [ ] Overweight/Obesity   [ ] Altered GI Function [ ] Unintended Weight Loss [ ] Food & Nutrition Related Knowledge Deficit [ ] Malnutrition     Nutrition Diagnosis is [ X] ongoing  [ ] resolved [ ] not applicable     New Nutrition Diagnosis: [ ] not applicable       Interventions: Optimal nutrition meeting >75% of energy nutrient needs via tolerated route   Recommend  [ ] Change Diet To:  [ ] Nutrition Supplement  [ ] Nutrition Support  [X ] Other: Add Ensure Compact, 4 oz BID daily & Nursing to continue with meal set up and encouragement with aspiration precaution     Monitoring and Evaluation:   [X ] PO intake [ x ] Tolerance to diet prescription [ x ] weights [ x ] labs[ x ] follow up per protocol  [ ] other:

## 2023-04-20 PROCEDURE — 82553 CREATINE MB FRACTION: CPT

## 2023-04-20 PROCEDURE — 36415 COLL VENOUS BLD VENIPUNCTURE: CPT

## 2023-04-20 PROCEDURE — 97110 THERAPEUTIC EXERCISES: CPT

## 2023-04-20 PROCEDURE — G1004: CPT

## 2023-04-20 PROCEDURE — 82550 ASSAY OF CK (CPK): CPT

## 2023-04-20 PROCEDURE — 85025 COMPLETE CBC W/AUTO DIFF WBC: CPT

## 2023-04-20 PROCEDURE — 80053 COMPREHEN METABOLIC PANEL: CPT

## 2023-04-20 PROCEDURE — 80048 BASIC METABOLIC PNL TOTAL CA: CPT

## 2023-04-20 PROCEDURE — 97116 GAIT TRAINING THERAPY: CPT

## 2023-04-20 PROCEDURE — 97162 PT EVAL MOD COMPLEX 30 MIN: CPT

## 2023-04-20 PROCEDURE — 97530 THERAPEUTIC ACTIVITIES: CPT

## 2023-04-20 PROCEDURE — 82962 GLUCOSE BLOOD TEST: CPT

## 2023-04-20 PROCEDURE — 87635 SARS-COV-2 COVID-19 AMP PRB: CPT

## 2023-04-20 PROCEDURE — 84100 ASSAY OF PHOSPHORUS: CPT

## 2023-04-20 PROCEDURE — 70450 CT HEAD/BRAIN W/O DYE: CPT | Mod: MG

## 2023-04-20 PROCEDURE — 86803 HEPATITIS C AB TEST: CPT

## 2023-04-20 PROCEDURE — 81001 URINALYSIS AUTO W/SCOPE: CPT

## 2023-04-20 PROCEDURE — 0225U NFCT DS DNA&RNA 21 SARSCOV2: CPT

## 2023-04-20 PROCEDURE — 72125 CT NECK SPINE W/O DYE: CPT | Mod: MG

## 2023-04-20 PROCEDURE — 84484 ASSAY OF TROPONIN QUANT: CPT

## 2023-04-20 PROCEDURE — 85027 COMPLETE CBC AUTOMATED: CPT

## 2023-04-20 PROCEDURE — 99285 EMERGENCY DEPT VISIT HI MDM: CPT

## 2023-04-20 PROCEDURE — 83735 ASSAY OF MAGNESIUM: CPT

## 2025-07-28 NOTE — DISCHARGE NOTE NURSING/CASE MANAGEMENT/SOCIAL WORK - PATIENT PORTAL LINK FT
Reason for Call:  Appointment Request    Patient requesting this type of appt:  Overnight Oximetry    Requested provider: Sujatha Baum location    Reason patient unable to be scheduled: Needs to be scheduled by clinic    When does patient want to be seen/preferred time: Next available    Comments: Please assist pt with scheduling overnight oximetry study.    Could we send this information to you in [employer] or would you prefer to receive a phone call?:   Patient would prefer a phone call   Okay to leave a detailed message?: Yes at Cell number on file:    Telephone Information:   Mobile [phone]       Call taken on 7/28/2025 at 1:29 PM by Zarina Elizondo   You can access the FollowMyHealth Patient Portal offered by [facility] by registering at the following website: [email or web address]. By joining [facility]’s FollowMyHealth portal, you will also be able to view your health information using other applications (apps) compatible with our system.